# Patient Record
Sex: FEMALE | Race: WHITE | NOT HISPANIC OR LATINO | URBAN - METROPOLITAN AREA
[De-identification: names, ages, dates, MRNs, and addresses within clinical notes are randomized per-mention and may not be internally consistent; named-entity substitution may affect disease eponyms.]

---

## 2017-03-02 ENCOUNTER — APPOINTMENT (OUTPATIENT)
Dept: URBAN - METROPOLITAN AREA CLINIC 200 | Age: 37
Setting detail: DERMATOLOGY
End: 2017-03-03

## 2017-03-02 PROBLEM — D23.5 OTHER BENIGN NEOPLASM OF SKIN OF TRUNK: Status: ACTIVE | Noted: 2017-03-02

## 2017-03-02 PROCEDURE — 99213 OFFICE O/P EST LOW 20 MIN: CPT

## 2017-03-02 PROCEDURE — OTHER COUNSELING: OTHER

## 2018-05-04 ENCOUNTER — APPOINTMENT (OUTPATIENT)
Dept: URBAN - METROPOLITAN AREA CLINIC 200 | Age: 38
Setting detail: DERMATOLOGY
End: 2018-05-09

## 2018-05-04 DIAGNOSIS — L57.8 OTHER SKIN CHANGES DUE TO CHRONIC EXPOSURE TO NONIONIZING RADIATION: ICD-10-CM

## 2018-05-04 PROCEDURE — OTHER COUNSELING: OTHER

## 2018-05-04 PROCEDURE — 99213 OFFICE O/P EST LOW 20 MIN: CPT

## 2018-05-04 ASSESSMENT — LOCATION DETAILED DESCRIPTION DERM: LOCATION DETAILED: LEFT MEDIAL SUPERIOR CHEST

## 2018-05-04 ASSESSMENT — LOCATION ZONE DERM: LOCATION ZONE: TRUNK

## 2018-05-04 ASSESSMENT — LOCATION SIMPLE DESCRIPTION DERM: LOCATION SIMPLE: CHEST

## 2018-10-16 RX ORDER — DESOGESTREL/ETHINYL ESTRADIOL AND ETHINYL ESTRADIOL 21-5 (28)
KIT ORAL
Qty: 28 TABLET | Refills: 3 | Status: SHIPPED | OUTPATIENT
Start: 2018-10-16 | End: 2019-01-26 | Stop reason: SDUPTHER

## 2018-11-26 PROBLEM — F41.1 GENERALIZED ANXIETY DISORDER: Status: ACTIVE | Noted: 2018-11-26

## 2018-11-26 PROBLEM — I10 ESSENTIAL HYPERTENSION, BENIGN: Status: ACTIVE | Noted: 2018-11-26

## 2018-11-26 RX ORDER — LABETALOL 100 MG/1
100 TABLET, FILM COATED ORAL 2 TIMES DAILY
COMMUNITY
End: 2018-12-19 | Stop reason: DRUGHIGH

## 2018-11-26 RX ORDER — LISINOPRIL 20 MG/1
20 TABLET ORAL DAILY
COMMUNITY
End: 2018-12-04 | Stop reason: ALTCHOICE

## 2018-11-26 RX ORDER — ESCITALOPRAM OXALATE 10 MG/1
10 TABLET ORAL DAILY
COMMUNITY
End: 2019-01-28 | Stop reason: SDUPTHER

## 2018-12-04 ENCOUNTER — OFFICE VISIT (OUTPATIENT)
Dept: PRIMARY CARE | Facility: CLINIC | Age: 38
End: 2018-12-04
Payer: COMMERCIAL

## 2018-12-04 VITALS
WEIGHT: 161 LBS | SYSTOLIC BLOOD PRESSURE: 120 MMHG | HEART RATE: 70 BPM | HEIGHT: 62 IN | BODY MASS INDEX: 29.63 KG/M2 | DIASTOLIC BLOOD PRESSURE: 90 MMHG

## 2018-12-04 DIAGNOSIS — F41.1 GENERALIZED ANXIETY DISORDER: ICD-10-CM

## 2018-12-04 DIAGNOSIS — I10 ESSENTIAL HYPERTENSION, BENIGN: Primary | ICD-10-CM

## 2018-12-04 PROCEDURE — 99214 OFFICE O/P EST MOD 30 MIN: CPT | Performed by: NURSE PRACTITIONER

## 2018-12-04 ASSESSMENT — ENCOUNTER SYMPTOMS
NECK PAIN: 0
HYPERTENSION: 1
CONSTITUTIONAL NEGATIVE: 1
SHORTNESS OF BREATH: 0
ORTHOPNEA: 0
RESPIRATORY NEGATIVE: 1
CARDIOVASCULAR NEGATIVE: 1
BLURRED VISION: 0
PALPITATIONS: 0
SWEATS: 0
HEADACHES: 0

## 2018-12-04 NOTE — ASSESSMENT & PLAN NOTE
Increase Labetolol to 200mg BID  BP checks at home.  Call with readings in 3 weeks.  May stop OCs in Jan.  Prenatals daily.  BP check here 3 mo.

## 2018-12-04 NOTE — PROGRESS NOTES
[facility]  Beronica Alaniz  [address], [address]  [place] [address]  Phone: [phone]  Fax: [phone]      Patient ID: Ro Beck                              : 1980    Visit Date: 2018    Chief Complaint: Blood Pressure Check         Patient ID: Ro Beck is a 38 y.o. female.    Patient Active Problem List   Diagnosis   • Essential hypertension, benign   • Generalized anxiety disorder         Current Outpatient Prescriptions:   •  escitalopram (LEXAPRO) 10 mg tablet, Take 10 mg by mouth daily., Disp: , Rfl:   •  labetalol (NORMODYNE) 100 mg tablet, Take 100 mg by mouth 2 (two) times a day., Disp: , Rfl:   •  PIMTREA, 28, 0.15-0.02 mgx21 /0.01 mg x 5 per tablet, take 1 tablet by mouth once daily, Disp: 28 tablet, Rfl: 3    No Known Allergies    Social History     Social History   • Marital status:      Spouse name: N/A   • Number of children: N/A   • Years of education: N/A     Occupational History   • Not on file.     Social History Main Topics   • Smoking status: Never Smoker   • Smokeless tobacco: Never Used   • Alcohol use Not on file   • Drug use: Unknown   • Sexual activity: Not on file     Other Topics Concern   • Not on file     Social History Narrative   • No narrative on file       Health Maintenance   Topic Date Due   • Varicella Vaccines (1 of 2 - 2-dose adolescent series) 1995   • DTaP, Tdap, and Td Vaccines (1 - Tdap) 2001   • Influenza Vaccine (1) 2018   • Pap Smear  2021   • HPV Vaccines  Aged Out   • Meningococcal Vaccine  Aged Out   • HIB Vaccines  Aged Out   • IPV Vaccines  Aged Out       HPI  Bp check on Labetolol. Off Lisinopril. Plans to get pregnant.  Has been on 6 weeks.  Readings at home-- 120-130/90  Feels fine on med.  Still on OCs for now.      Hypertension   This is a chronic problem. The current episode started more than 1 year ago. The problem is unchanged. The problem is  "controlled. Pertinent negatives include no anxiety, blurred vision, chest pain, headaches, malaise/fatigue, neck pain, orthopnea, palpitations, peripheral edema, shortness of breath or sweats. Agents associated with hypertension include oral contraceptives. There are no known risk factors for coronary artery disease. Past treatments include ACE inhibitors and beta blockers. The current treatment provides significant improvement.       The following have been reviewed and updated as appropriate in this visit:  Allergies  Meds  Problems         Review of System  Review of Systems   Constitutional: Negative.  Negative for malaise/fatigue.   Eyes: Negative for blurred vision.   Respiratory: Negative.  Negative for shortness of breath.    Cardiovascular: Negative.  Negative for chest pain, palpitations and orthopnea.   Musculoskeletal: Negative for neck pain.   Neurological: Negative for headaches.       Objective     Vitals  Vitals:    12/04/18 1458 12/04/18 1509   BP: 126/80 120/90   Pulse: 70    Weight: 73 kg (161 lb)    Height: 1.575 m (5' 2\")      Body mass index is 29.45 kg/m².    Physical Exam  Physical Exam   Constitutional: She is oriented to person, place, and time. She appears well-developed and well-nourished. No distress.   Neck: Neck supple. No JVD present. No thyromegaly present.   Cardiovascular: Normal rate, regular rhythm and normal heart sounds.    No murmur heard.  Pulmonary/Chest: Effort normal and breath sounds normal. No respiratory distress. She has no wheezes.   Lymphadenopathy:     She has no cervical adenopathy.   Neurological: She is alert and oriented to person, place, and time.   Skin: She is not diaphoretic.   Vitals reviewed.      Assessment/Plan     Problem List Items Addressed This Visit     Essential hypertension, benign - Primary     Increase Labetolol to 200mg BID  BP checks at home.  Call with readings in 3 weeks.  May stop OCs in Jan.  Prenatals daily.  BP check here 3 mo.      "    Generalized anxiety disorder     Stable on Lexapro.  Will stop once she is pregnant.                   CHOCO Laws  12/4/2018

## 2018-12-19 RX ORDER — LABETALOL 200 MG/1
200 TABLET, FILM COATED ORAL 2 TIMES DAILY
Qty: 60 TABLET | Refills: 5 | Status: SHIPPED | OUTPATIENT
Start: 2018-12-19 | End: 2019-06-11 | Stop reason: SDUPTHER

## 2019-01-29 RX ORDER — DESOGESTREL AND ETHINYL ESTRADIOL 21-5 (28)
KIT ORAL
Qty: 28 TABLET | Refills: 3 | Status: SHIPPED | OUTPATIENT
Start: 2019-01-29 | End: 2019-02-13 | Stop reason: ALTCHOICE

## 2019-01-29 RX ORDER — ESCITALOPRAM OXALATE 10 MG/1
TABLET ORAL
Qty: 30 TABLET | Refills: 5 | Status: SHIPPED | OUTPATIENT
Start: 2019-01-29 | End: 2019-08-02

## 2019-02-13 ENCOUNTER — OFFICE VISIT (OUTPATIENT)
Dept: PRIMARY CARE | Facility: CLINIC | Age: 39
End: 2019-02-13
Payer: COMMERCIAL

## 2019-02-13 ENCOUNTER — TELEPHONE (OUTPATIENT)
Dept: PRIMARY CARE | Facility: CLINIC | Age: 39
End: 2019-02-13

## 2019-02-13 VITALS
HEIGHT: 62 IN | BODY MASS INDEX: 30.16 KG/M2 | DIASTOLIC BLOOD PRESSURE: 70 MMHG | SYSTOLIC BLOOD PRESSURE: 112 MMHG | HEART RATE: 64 BPM | WEIGHT: 163.9 LBS

## 2019-02-13 DIAGNOSIS — I10 ESSENTIAL HYPERTENSION, BENIGN: Primary | ICD-10-CM

## 2019-02-13 DIAGNOSIS — F41.1 GENERALIZED ANXIETY DISORDER: ICD-10-CM

## 2019-02-13 PROCEDURE — 99213 OFFICE O/P EST LOW 20 MIN: CPT | Performed by: NURSE PRACTITIONER

## 2019-02-13 ASSESSMENT — ENCOUNTER SYMPTOMS
NECK PAIN: 0
SHORTNESS OF BREATH: 0
ORTHOPNEA: 0
BLURRED VISION: 0
PALPITATIONS: 0
HEADACHES: 0
SWEATS: 0
HYPERTENSION: 1

## 2019-02-13 NOTE — TELEPHONE ENCOUNTER
SAMREEN pt made her aware must stop lexapro before trying to get pregnant. She understood and will email us when she is stopping

## 2019-02-13 NOTE — TELEPHONE ENCOUNTER
Please call pt. I researched Lexapro use and it is a category C. She should stop prior to getting pregnant.

## 2019-02-13 NOTE — PROGRESS NOTES
[facility]  Beronica Alaniz  [address], [address]  [place] [address]  Phone: [phone]  Fax: [phone]      Patient ID: Ro Beck                              : 1980    Visit Date: 2019    Chief Complaint: Hypertension         Patient ID: Ro Beck is a 38 y.o. female.    Patient Active Problem List   Diagnosis   • Essential hypertension, benign   • Generalized anxiety disorder         Current Outpatient Prescriptions:   •  escitalopram (LEXAPRO) 10 mg tablet, take 1 tablet by mouth once daily, Disp: 30 tablet, Rfl: 5  •  labetalol (NORMODYNE) 200 mg tablet, Take 1 tablet (200 mg total) by mouth 2 (two) times a day., Disp: 60 tablet, Rfl: 5    No Known Allergies    Social History     Social History   • Marital status:      Spouse name: N/A   • Number of children: N/A   • Years of education: N/A     Occupational History   • Not on file.     Social History Main Topics   • Smoking status: Never Smoker   • Smokeless tobacco: Never Used   • Alcohol use Not on file   • Drug use: Unknown   • Sexual activity: Not on file     Other Topics Concern   • Not on file     Social History Narrative   • No narrative on file       Health Maintenance   Topic Date Due   • Varicella Vaccines (1 of 2 - 2-dose adolescent series) 1995   • DTaP, Tdap, and Td Vaccines (1 - Tdap) 2001   • Influenza Vaccine (1) 2018   • Pap Smear  2021   • HPV Vaccines  Aged Out   • Meningococcal Vaccine  Aged Out   • HIB Vaccines  Aged Out   • IPV Vaccines  Aged Out       HPI  BP check.  Tolerating Labetalol well.  Stopped her OCs. No menses yet.    On Lexapro. Asking if she can continue this med if she gets pregnant. Sees this office for her routine GYN care. Will have to choose an OB--prefers [facility].      Hypertension   This is a chronic problem. The current episode started more than 1 year ago. The problem is unchanged. The problem is controlled.  "Pertinent negatives include no anxiety, blurred vision, chest pain, headaches, malaise/fatigue, neck pain, orthopnea, palpitations, peripheral edema, shortness of breath or sweats. There are no associated agents to hypertension. There are no known risk factors for coronary artery disease. Past treatments include beta blockers. The current treatment provides no improvement. There are no compliance problems.        The following have been reviewed and updated as appropriate in this visit:  Allergies  Meds  Problems         Review of System  Review of Systems   Constitutional: Negative for malaise/fatigue.   Eyes: Negative for blurred vision.   Respiratory: Negative for shortness of breath.    Cardiovascular: Negative for chest pain, palpitations and orthopnea.   Musculoskeletal: Negative for neck pain.   Neurological: Negative for headaches.       Objective     Vitals  Vitals:    02/13/19 1328   BP: 112/70   Pulse: 64   Weight: 74.3 kg (163 lb 14.4 oz)   Height: 1.575 m (5' 2\")     Body mass index is 29.98 kg/m².    Physical Exam  Physical Exam   Constitutional: She is oriented to person, place, and time. She appears well-developed and well-nourished. No distress.   Neck: Neck supple. No JVD present. No thyromegaly present.   Cardiovascular: Normal rate, regular rhythm and normal heart sounds.    No murmur heard.  Pulmonary/Chest: Effort normal and breath sounds normal. No respiratory distress. She has no wheezes.   Lymphadenopathy:     She has no cervical adenopathy.   Neurological: She is alert and oriented to person, place, and time.   Skin: She is not diaphoretic.   Vitals reviewed.      Assessment/Plan     Problem List Items Addressed This Visit     Essential hypertension, benign - Primary     Continue med.  BP stable.  Follow up 6 mo.         Generalized anxiety disorder     Will research Lexapro use during pregnancy and get back to pt with recommendations.                   CHOCO Laws  2/13/2019  "

## 2019-07-23 ENCOUNTER — APPOINTMENT (OUTPATIENT)
Dept: URBAN - METROPOLITAN AREA CLINIC 200 | Age: 39
Setting detail: DERMATOLOGY
End: 2019-07-24

## 2019-07-23 DIAGNOSIS — L57.8 OTHER SKIN CHANGES DUE TO CHRONIC EXPOSURE TO NONIONIZING RADIATION: ICD-10-CM

## 2019-07-23 PROCEDURE — 99213 OFFICE O/P EST LOW 20 MIN: CPT

## 2019-07-23 PROCEDURE — OTHER COUNSELING: OTHER

## 2019-07-23 ASSESSMENT — LOCATION ZONE DERM: LOCATION ZONE: TRUNK

## 2019-07-23 ASSESSMENT — LOCATION SIMPLE DESCRIPTION DERM: LOCATION SIMPLE: CHEST

## 2019-07-23 ASSESSMENT — LOCATION DETAILED DESCRIPTION DERM: LOCATION DETAILED: LEFT MEDIAL SUPERIOR CHEST

## 2019-08-02 ENCOUNTER — OFFICE VISIT (OUTPATIENT)
Dept: PRIMARY CARE | Facility: CLINIC | Age: 39
End: 2019-08-02
Payer: COMMERCIAL

## 2019-08-02 VITALS
WEIGHT: 153.6 LBS | DIASTOLIC BLOOD PRESSURE: 80 MMHG | HEIGHT: 62 IN | BODY MASS INDEX: 28.26 KG/M2 | HEART RATE: 70 BPM | SYSTOLIC BLOOD PRESSURE: 140 MMHG

## 2019-08-02 DIAGNOSIS — M77.8 LEFT WRIST TENDONITIS: Primary | ICD-10-CM

## 2019-08-02 DIAGNOSIS — I10 ESSENTIAL HYPERTENSION, BENIGN: ICD-10-CM

## 2019-08-02 PROCEDURE — 99214 OFFICE O/P EST MOD 30 MIN: CPT | Performed by: NURSE PRACTITIONER

## 2019-08-02 RX ORDER — NAPROXEN 500 MG/1
500 TABLET ORAL 2 TIMES DAILY WITH MEALS
Qty: 28 TABLET | Refills: 0 | Status: SHIPPED | OUTPATIENT
Start: 2019-08-02 | End: 2019-08-30 | Stop reason: ALTCHOICE

## 2019-08-02 RX ORDER — LABETALOL 200 MG/1
200 TABLET, FILM COATED ORAL
Qty: 60 TABLET | Refills: 5 | Status: SHIPPED | OUTPATIENT
Start: 2019-08-02 | End: 2020-01-30 | Stop reason: SDUPTHER

## 2019-08-02 ASSESSMENT — ENCOUNTER SYMPTOMS
INABILITY TO BEAR WEIGHT: 0
STIFFNESS: 0
BLURRED VISION: 0
NUMBNESS: 0
SWEATS: 0
SHORTNESS OF BREATH: 0
LIMITED RANGE OF MOTION: 0
HYPERTENSION: 1
JOINT LOCKING: 0
HEADACHES: 0
FEVER: 0
TINGLING: 0

## 2019-08-02 NOTE — ASSESSMENT & PLAN NOTE
Naproxen BID #28  Compression sleeve for wrist during the day.  ICE daily for 20 minutes.  Follow up 2 weeks if not resolved.

## 2019-08-02 NOTE — PROGRESS NOTES
[facility]  Beronica Alaniz  [address], [address]  [place] [address]  Phone: [phone]  Fax: [phone]      Patient ID: Ro Beck                              : 1980    Visit Date: 2019    Chief Complaint: Wrist Pain (left)         Patient ID: Ro Beck is a 38 y.o. female.    Patient Active Problem List   Diagnosis   • Essential hypertension, benign   • Generalized anxiety disorder   • Left wrist tendonitis         Current Outpatient Prescriptions:   •  labetalol (NORMODYNE) 200 mg tablet, Take 1 tablet (200 mg total) by mouth 2 (two) times a day., Disp: 60 tablet, Rfl: 5  •  naproxen (NAPROSYN) 500 mg tablet, Take 1 tablet (500 mg total) by mouth 2 (two) times a day with meals for 14 days., Disp: 28 tablet, Rfl: 0    No Known Allergies    Social History     Social History   • Marital status:      Spouse name: N/A   • Number of children: N/A   • Years of education: N/A     Occupational History   • Not on file.     Social History Main Topics   • Smoking status: Never Smoker   • Smokeless tobacco: Never Used   • Alcohol use Not on file   • Drug use: Unknown   • Sexual activity: Not on file     Other Topics Concern   • Not on file     Social History Narrative   • No narrative on file       Health Maintenance   Topic Date Due   • Varicella Vaccines (1 of 2 - 13+ 2-dose series) 1995   • HIV Screening  1995   • DTaP, Tdap, and Td Vaccines (1 - Tdap) 2001   • Cervical Cancer Screening  2003   • Influenza Vaccine (1) 2019   • Meningococcal ACWY  Aged Out   • HIB Vaccines  Aged Out   • IPV Vaccines  Aged Out   • HPV Vaccines  Aged Out   • Pneumococcal  Aged Out       HPI  L wrist pain x past week. Started after she tried to open a tight jar and felt a little pop.  No redness, swelling, warmth. NL ROM. Pain in thumb pad and radiates to wrist.      Wrist Pain    The pain is present in the left wrist. This is a new  "problem. The current episode started 1 to 4 weeks ago. There has been a history of trauma. The problem occurs constantly. The problem has been unchanged. The quality of the pain is described as sharp. The pain is moderate. Pertinent negatives include no fever, inability to bear weight, itching, joint locking, joint swelling, limited range of motion, numbness, stiffness or tingling. The symptoms are aggravated by activity. She has tried nothing for the symptoms.   Hypertension   This is a chronic problem. The current episode started more than 1 year ago. The problem is unchanged. The problem is controlled. Pertinent negatives include no anxiety, blurred vision, chest pain, headaches, malaise/fatigue, peripheral edema, shortness of breath or sweats. There are no associated agents to hypertension. Past treatments include beta blockers. The current treatment provides significant improvement. There are no compliance problems.        The following have been reviewed and updated as appropriate in this visit:  Allergies  Meds  Problems         Review of System  Review of Systems   Constitutional: Negative for fever and malaise/fatigue.   Eyes: Negative for blurred vision.   Respiratory: Negative for shortness of breath.    Cardiovascular: Negative for chest pain.   Musculoskeletal: Negative for stiffness.   Skin: Negative for itching.   Neurological: Negative for tingling, numbness and headaches.       Objective     Vitals  Vitals:    08/02/19 0912   BP: 140/80   BP Location: Left upper arm   Patient Position: Sitting   Pulse: 70   Weight: 69.7 kg (153 lb 9.6 oz)   Height: 1.575 m (5' 2\")     Body mass index is 28.09 kg/m².    Physical Exam  Physical Exam   Constitutional: She is oriented to person, place, and time. She appears well-developed and well-nourished. No distress.   Cardiovascular: Normal rate, regular rhythm and normal heart sounds.    No murmur heard.  Pulmonary/Chest: Effort normal and breath sounds normal. " No respiratory distress. She has no wheezes. She has no rales.   Musculoskeletal:        Left wrist: She exhibits tenderness and bony tenderness. She exhibits normal range of motion, no swelling, no effusion, no crepitus, no deformity and no laceration.   Neurological: She is alert and oriented to person, place, and time.   Skin: She is not diaphoretic.   Vitals reviewed.      Assessment/Plan     Problem List Items Addressed This Visit     Essential hypertension, benign     Continue Labetolol BID.  Follow up 6 mo.         Relevant Medications    labetalol (NORMODYNE) 200 mg tablet    Left wrist tendonitis - Primary     Naproxen BID #28  Compression sleeve for wrist during the day.  ICE daily for 20 minutes.  Follow up 2 weeks if not resolved.         Relevant Medications    naproxen (NAPROSYN) 500 mg tablet              CHOCO Laws  8/2/2019

## 2019-08-30 ENCOUNTER — OFFICE VISIT (OUTPATIENT)
Dept: PRIMARY CARE | Facility: CLINIC | Age: 39
End: 2019-08-30
Payer: COMMERCIAL

## 2019-08-30 VITALS
DIASTOLIC BLOOD PRESSURE: 80 MMHG | BODY MASS INDEX: 27.79 KG/M2 | HEART RATE: 64 BPM | WEIGHT: 151 LBS | SYSTOLIC BLOOD PRESSURE: 122 MMHG | HEIGHT: 62 IN

## 2019-08-30 DIAGNOSIS — Z01.419 ENCOUNTER FOR GYNECOLOGICAL EXAMINATION WITHOUT ABNORMAL FINDING: Primary | ICD-10-CM

## 2019-08-30 DIAGNOSIS — M77.8 LEFT WRIST TENDONITIS: ICD-10-CM

## 2019-08-30 DIAGNOSIS — Z23 NEED FOR VACCINATION: ICD-10-CM

## 2019-08-30 DIAGNOSIS — I10 ESSENTIAL HYPERTENSION, BENIGN: ICD-10-CM

## 2019-08-30 PROCEDURE — S0612 ANNUAL GYNECOLOGICAL EXAMINA: HCPCS | Performed by: NURSE PRACTITIONER

## 2019-08-30 PROCEDURE — 90686 IIV4 VACC NO PRSV 0.5 ML IM: CPT | Performed by: NURSE PRACTITIONER

## 2019-08-30 PROCEDURE — 90471 IMMUNIZATION ADMIN: CPT | Performed by: NURSE PRACTITIONER

## 2019-08-30 ASSESSMENT — ENCOUNTER SYMPTOMS
CARDIOVASCULAR NEGATIVE: 1
CONSTITUTIONAL NEGATIVE: 1
GASTROINTESTINAL NEGATIVE: 1
RESPIRATORY NEGATIVE: 1

## 2019-08-30 NOTE — PROGRESS NOTES
[facility]  CHOCO Laws  [address]  [place] [address]  Phone: [phone]  Fax: [phone]      2019    Ro Beck is a 38 y.o. female who presents for annual exam.   Periods are regular every 50 days, lasting 5 days. Dysmenorrhea:none. Cyclic symptoms include none. No intermenstrual bleeding, spotting, or discharge.    The patient is sexually active. GYN screening history: last pap: was normal. Domestic violence: no.     Current contraception: none  History of abnormal Pap smear: no  Family history of uterine or ovarian cancer: no  Regular self breast exam: yes  History of abnormal mammogram: no  Family history of breast cancer: no  History of abnormal lipids: no    Menstrual History:  OB History      Para Term  AB Living    0 0 0 0 0 0    SAB TAB Ectopic Multiple Live Births    0 0 0 0 0         Patient's last menstrual period was 2019.         No past medical history on file.    Past Surgical History:   Procedure Laterality Date   • WISDOM TOOTH EXTRACTION         Family History   Problem Relation Age of Onset   • Uterine cancer Mother    • Skin cancer Father    • No Known Problems Sister    • Alzheimer's disease Maternal Grandmother        Social History     Social History   • Marital status:      Spouse name: N/A   • Number of children: N/A   • Years of education: N/A     Occupational History   • Not on file.     Social History Main Topics   • Smoking status: Never Smoker   • Smokeless tobacco: Never Used   • Alcohol use Not on file   • Drug use: Unknown   • Sexual activity: Not on file     Other Topics Concern   • Not on file     Social History Narrative   • No narrative on file       The following have been reviewed and updated as appropriate in this visit: Allergies  Meds  Problems         HPI  Routine GYN exam.        Review Of Systems  Review of Systems   Constitutional: Negative.    Respiratory:  "Negative.    Cardiovascular: Negative.    Gastrointestinal: Negative.    Genitourinary: Negative.    Breast: Negative.       /80 (BP Location: Right upper arm, Patient Position: Sitting)   Pulse 64   Ht 1.575 m (5' 2\")   Wt 68.5 kg (151 lb)   LMP 08/11/2019   BMI 27.62 kg/m²     OB/GYN Physical Exam  Physical Exam   Constitutional: She appears well-developed and well-nourished. No distress.   Genitourinary: Vagina normal and uterus normal.   External female genitalia normal.   Urethral meatus normal.   Vagina normal.   Cervix exam normal.  Uterus is normal.   Adnexa normal.   Neck: Neck supple. No JVD present. No thyromegaly present.   Cardiovascular: Normal rate, regular rhythm and normal heart sounds.    No murmur heard.  Pulmonary/Chest: Effort normal and breath sounds normal. No respiratory distress. She has no wheezes. She has no rales. Right breast exhibits no inverted nipple, no mass, no nipple discharge, no skin change and no tenderness. Left breast exhibits no inverted nipple, no mass, no nipple discharge, no skin change and no tenderness.   Abdominal: Soft. Bowel sounds are normal. She exhibits no distension and no mass. There is no tenderness. There is no rebound and no guarding.   Lymphadenopathy:     She has no cervical adenopathy.   Skin: She is not diaphoretic.   Vitals reviewed.        Problem List Items Addressed This Visit     Essential hypertension, benign    Current Assessment & Plan     Stable on the Labetolol.  Continue med.  Follow up 6 mo.         Left wrist tendonitis    Current Assessment & Plan     50% better after treatment.  Finished Naproxen.  Continue compression sleeve and ice daily.  PRN Aleve if needed.  Will refer to Dr Craven if persists.         Encounter for gynecological examination without abnormal finding - Primary    Current Assessment & Plan     PAP sent.  Continue prenatals.  Continue Labetolol.  Flu shot given.         Relevant Orders    Cytology, Thinprep " Pap    PAP W/REFLEX HR HPV      Other Visit Diagnoses     Need for vaccination        Relevant Orders    Influenza vaccine quadrivalent preservative free 6 mon and older IM (FluLaval) (Completed)              CHOCO Laws  8/30/2019

## 2019-08-30 NOTE — ASSESSMENT & PLAN NOTE
50% better after treatment.  Finished Naproxen.  Continue compression sleeve and ice daily.  PRN Aleve if needed.  Will refer to Dr Craven if persists.

## 2019-09-04 LAB
CYTOLOGIST CVX/VAG CYTO: NORMAL
CYTOLOGY CVX/VAG DOC CYTO: NORMAL
CYTOLOGY CVX/VAG DOC THIN PREP: NORMAL
DX ICD CODE: NORMAL
LAB CORP .: NORMAL
LAB CORP NOTE:: NORMAL
OTHER STN SPEC: NORMAL
STAT OF ADQ CVX/VAG CYTO-IMP: NORMAL

## 2020-01-30 DIAGNOSIS — I10 ESSENTIAL HYPERTENSION, BENIGN: ICD-10-CM

## 2020-01-30 RX ORDER — LABETALOL 200 MG/1
200 TABLET, FILM COATED ORAL
Qty: 60 TABLET | Refills: 5 | Status: SHIPPED | OUTPATIENT
Start: 2020-01-30 | End: 2020-07-27 | Stop reason: SDUPTHER

## 2020-01-30 NOTE — TELEPHONE ENCOUNTER
Medicine Refill Request    Last Office Visit: 8/30/2019  Next Office Visit: 2/19/2020        Current Outpatient Medications:   •  labetalol (NORMODYNE) 200 mg tablet, Take 1 tablet (200 mg total) by mouth 2 (two) times a day., Disp: 60 tablet, Rfl: 5      BP Readings from Last 3 Encounters:   08/30/19 122/80   08/02/19 140/80   02/13/19 112/70       Recent Lab results:  No results found for: CHOL, No results found for: HDL, No results found for: LDLCALC, No results found for: TRIG     No results found for: GLUCOSE, No results found for: HGBA1C      No results found for: CREATININE    No results found for: TSH

## 2020-02-14 ENCOUNTER — OFFICE VISIT (OUTPATIENT)
Dept: PRIMARY CARE | Facility: CLINIC | Age: 40
End: 2020-02-14
Payer: COMMERCIAL

## 2020-02-14 VITALS
HEART RATE: 69 BPM | SYSTOLIC BLOOD PRESSURE: 126 MMHG | HEIGHT: 62 IN | RESPIRATION RATE: 16 BRPM | BODY MASS INDEX: 27.23 KG/M2 | OXYGEN SATURATION: 98 % | WEIGHT: 148 LBS | DIASTOLIC BLOOD PRESSURE: 78 MMHG

## 2020-02-14 DIAGNOSIS — I10 ESSENTIAL HYPERTENSION, BENIGN: ICD-10-CM

## 2020-02-14 DIAGNOSIS — J40 SINOBRONCHITIS: Primary | ICD-10-CM

## 2020-02-14 DIAGNOSIS — J32.9 SINOBRONCHITIS: Primary | ICD-10-CM

## 2020-02-14 DIAGNOSIS — Z00.00 PREVENTATIVE HEALTH CARE: ICD-10-CM

## 2020-02-14 PROBLEM — M77.8 LEFT WRIST TENDONITIS: Status: RESOLVED | Noted: 2019-08-02 | Resolved: 2020-02-14

## 2020-02-14 PROBLEM — Z01.419 ENCOUNTER FOR GYNECOLOGICAL EXAMINATION WITHOUT ABNORMAL FINDING: Status: RESOLVED | Noted: 2019-08-30 | Resolved: 2020-02-14

## 2020-02-14 PROCEDURE — 99214 OFFICE O/P EST MOD 30 MIN: CPT | Performed by: NURSE PRACTITIONER

## 2020-02-14 RX ORDER — BENZONATATE 200 MG/1
200 CAPSULE ORAL 3 TIMES DAILY PRN
Qty: 30 CAPSULE | Refills: 0 | Status: SHIPPED | OUTPATIENT
Start: 2020-02-14 | End: 2020-02-24

## 2020-02-14 RX ORDER — AZITHROMYCIN 250 MG/1
TABLET, FILM COATED ORAL
Qty: 6 TABLET | Refills: 0 | Status: SHIPPED | OUTPATIENT
Start: 2020-02-14 | End: 2020-02-19

## 2020-02-14 ASSESSMENT — ENCOUNTER SYMPTOMS
SHORTNESS OF BREATH: 0
RHINORRHEA: 0
MYALGIAS: 0
HEMOPTYSIS: 0
SORE THROAT: 0
CHILLS: 0
SWEATS: 0
PALPITATIONS: 0
BLURRED VISION: 0
COUGH: 1
FEVER: 0
WHEEZING: 0
HYPERTENSION: 1
HEARTBURN: 0
HEADACHES: 0

## 2020-02-14 NOTE — ASSESSMENT & PLAN NOTE
Zpack as directed #1  Push po fluids  Tessalon Perles PRN cough.  Follow up if symptoms worsen/persist.

## 2020-02-14 NOTE — ASSESSMENT & PLAN NOTE
Stable on current med.  Continue Labetolol BID  Follow up 6 mo  Screening labs ordered to complete once she is feeling better.

## 2020-02-14 NOTE — PROGRESS NOTES
[facility]  Beronica Alaniz  [address]  [place] [address]  Phone: [phone]  Fax: [phone]      Patient ID: Ro Beck                              : 1980    Visit Date: 2020    Chief Complaint: Cough         Patient ID: Ro Beck is a 39 y.o. female.    Patient Active Problem List   Diagnosis   • Essential hypertension, benign   • Generalized anxiety disorder   • Sinobronchitis         Current Outpatient Medications:   •  labetalol (NORMODYNE) 200 mg tablet, Take 1 tablet (200 mg total) by mouth 2 (two) times a day., Disp: 60 tablet, Rfl: 5  •  azithromycin (ZITHROMAX) 250 mg tablet, Take 2 tablets the first day, then 1 tablet daily for 4 days., Disp: 6 tablet, Rfl: 0  •  benzonatate (TESSALON) 200 mg capsule, Take 1 capsule (200 mg total) by mouth 3 (three) times a day as needed for cough for up to 10 days., Disp: 30 capsule, Rfl: 0    No Known Allergies    Social History     Tobacco Use   • Smoking status: Never Smoker   • Smokeless tobacco: Never Used   Substance Use Topics   • Alcohol use: Not on file   • Drug use: Not on file       Health Maintenance   Topic Date Due   • Varicella Vaccines (1 of 2 - 13+ 2-dose series) 1995   • DTaP, Tdap, and Td Vaccines (1 - Tdap) 2001   • HIV Screening  2020 (Originally 1995)   • Cervical Cancer Screening  2024   • Influenza Vaccine  Completed   • Meningococcal ACWY  Aged Out   • HIB Vaccines  Aged Out   • IPV Vaccines  Aged Out   • HPV Vaccines  Aged Out   • Pneumococcal  Aged Out       HPI  Persistent cough after flu like illness that started on .  Also due for BP check up as well.    Cough   This is a new problem. The current episode started in the past 7 days. The problem has been unchanged. The problem occurs every few minutes. The cough is productive of purulent sputum (in AM). Associated symptoms include nasal congestion and postnasal drip. Pertinent  "negatives include no chest pain, chills, ear congestion, ear pain, fever, headaches, heartburn, hemoptysis, myalgias, rhinorrhea, sore throat, shortness of breath, sweats or wheezing. Nothing aggravates the symptoms. She has tried OTC cough suppressant and rest for the symptoms. The treatment provided mild relief.   Hypertension   This is a chronic problem. The current episode started more than 1 year ago. The problem is controlled. Pertinent negatives include no anxiety, blurred vision, chest pain, headaches, palpitations, peripheral edema, shortness of breath or sweats. There are no associated agents to hypertension. Past treatments include beta blockers. The current treatment provides significant improvement. There are no compliance problems.        The following have been reviewed and updated as appropriate in this visit:  Allergies  Meds  Problems         Review of System  Review of Systems   Constitutional: Negative for chills and fever.   HENT: Positive for postnasal drip. Negative for ear pain, rhinorrhea and sore throat.    Eyes: Negative for blurred vision.   Respiratory: Positive for cough. Negative for hemoptysis, shortness of breath and wheezing.    Cardiovascular: Negative for chest pain and palpitations.   Gastrointestinal: Negative for heartburn.   Musculoskeletal: Negative for myalgias.   Neurological: Negative for headaches.       Objective     Vitals  Vitals:    02/14/20 1003   BP: 126/78   BP Location: Left upper arm   Patient Position: Sitting   Pulse: 69   Resp: 16   SpO2: 98%   Weight: 67.1 kg (148 lb)   Height: 1.575 m (5' 2\")     Body mass index is 27.07 kg/m².    Physical Exam  Physical Exam   Constitutional: She appears well-developed and well-nourished. No distress.   HENT:   Right Ear: Tympanic membrane, external ear and ear canal normal.   Left Ear: Tympanic membrane, external ear and ear canal normal.   Nose: Mucosal edema present. No rhinorrhea. Right sinus exhibits maxillary sinus " tenderness. Left sinus exhibits maxillary sinus tenderness.   Mouth/Throat: Posterior oropharyngeal erythema present. No oropharyngeal exudate, posterior oropharyngeal edema or tonsillar abscesses.   Neck: Neck supple. No JVD present. No thyromegaly present.   Cardiovascular: Normal rate, regular rhythm and normal heart sounds. Exam reveals no friction rub.   No murmur heard.  Pulmonary/Chest: Breath sounds normal. No respiratory distress. She has no wheezes. She has no rales.   Deep barking cough noted, non productive   Lymphadenopathy:     She has no cervical adenopathy.   Skin: She is not diaphoretic.   Vitals reviewed.      Assessment/Plan     Problem List Items Addressed This Visit     Essential hypertension, benign     Stable on current med.  Continue Labetolol BID  Follow up 6 mo  Screening labs ordered to complete once she is feeling better.         Sinobronchitis - Primary     Zpack as directed #1  Push po fluids  Tessalon Perles PRN cough.  Follow up if symptoms worsen/persist.         Relevant Medications    azithromycin (ZITHROMAX) 250 mg tablet    benzonatate (TESSALON) 200 mg capsule      Other Visit Diagnoses     Preventative health care        Relevant Orders    CBC and Differential    Comprehensive metabolic panel    Lipid panel    TSH 3rd Generation    Urinalysis with Reflex Culture (ED and Outpatient only)        GYN/ BP check 6 mo      CHOCO Laws  2/14/2020

## 2020-07-20 ENCOUNTER — TELEPHONE (OUTPATIENT)
Dept: PRIMARY CARE | Facility: CLINIC | Age: 40
End: 2020-07-20

## 2020-07-20 NOTE — TELEPHONE ENCOUNTER
Pt called and her coworker has covid . She is having no symptoms. She will quarentine for 14 day and check [facility]  website and call one of the pharmacy to get tested.

## 2020-07-20 NOTE — TELEPHONE ENCOUNTER
If she wishes, we can contact our command center at [place] where she could have the nasal swab done. This is more accurate than the self administered testing. Also, it may be wise to wait a little for the [facility] testing, to day #7 or if she is sxic. THx

## 2020-07-21 NOTE — TELEPHONE ENCOUNTER
Please call patient and triage - if patient wishes contact [facility] center where she can have nasal swab done per Dr. Paul

## 2020-07-22 NOTE — TELEPHONE ENCOUNTER
LMOM for pt to call if she would to be scheduled for COVID testing . Did explain to pt, to wait 7 days before doing test if having no symptoms. Instructed pt to call office if she would like us to set up testing.

## 2020-07-22 NOTE — TELEPHONE ENCOUNTER
Pt called back said [facility] damaged her COVID sample so test was never performed. Pt came in contact with person who is COVID positive last Tuesday. Pt denies any symptoms at present except for a slight headache. Explained to pt [facility] has not been testing pt without symptoms. Pt was going to set up appt at [facility] for another test. Advised pt to continue to self quarantine and monitor temp for 7 more days. Pt agreed. And will notify you if any symptoms present

## 2020-07-27 DIAGNOSIS — I10 ESSENTIAL HYPERTENSION, BENIGN: ICD-10-CM

## 2020-07-27 NOTE — TELEPHONE ENCOUNTER
Medicine Refill Request    Last Office Visit: Visit date not found  Last Telemedicine Visit: Visit date not found    Next Office Visit: Visit date not found  Next Telemedicine Visit: Visit date not found     Pt needs BP check please have her make appointment     Current Outpatient Medications:   •  labetalol (NORMODYNE) 200 mg tablet, Take 1 tablet (200 mg total) by mouth 2 (two) times a day., Disp: 60 tablet, Rfl: 5      BP Readings from Last 3 Encounters:   02/14/20 126/78   08/30/19 122/80   08/02/19 140/80       Recent Lab results:  No results found for: CHOL, No results found for: HDL, No results found for: LDLCALC, No results found for: TRIG     No results found for: GLUCOSE, No results found for: HGBA1C      No results found for: CREATININE    No results found for: TSH

## 2020-07-28 NOTE — TELEPHONE ENCOUNTER
Pt has scheduled GYN appt for 09/01/2020 and is asking if  labetalol (NORMODYNE) 200 mg tablet, Take 1 tablet (200 mg total) by mouth 2 (two) times a day.,  Can be refilled prior to her appt.

## 2020-07-29 RX ORDER — LABETALOL 200 MG/1
200 TABLET, FILM COATED ORAL
Qty: 60 TABLET | Refills: 5 | Status: SHIPPED | OUTPATIENT
Start: 2020-07-29 | End: 2021-01-29 | Stop reason: SDUPTHER

## 2020-07-29 NOTE — TELEPHONE ENCOUNTER
Medicine Refill Request    Last Office Visit: 2/14/2020  Last Telemedicine Visit: Visit date not found    Next Office Visit: 9/1/2020  Next Telemedicine Visit: Visit date not found         Current Outpatient Medications:   •  labetalol (NORMODYNE) 200 mg tablet, Take 1 tablet (200 mg total) by mouth 2 (two) times a day., Disp: 60 tablet, Rfl: 5      BP Readings from Last 3 Encounters:   02/14/20 126/78   08/30/19 122/80   08/02/19 140/80       Recent Lab results:  No results found for: CHOL, No results found for: HDL, No results found for: LDLCALC, No results found for: TRIG     No results found for: GLUCOSE, No results found for: HGBA1C      No results found for: CREATININE    No results found for: TSH

## 2020-08-15 LAB
ALBUMIN SERPL-MCNC: 4.8 G/DL (ref 3.8–4.8)
ALBUMIN/GLOB SERPL: 2.1 {RATIO} (ref 1.2–2.2)
ALP SERPL-CCNC: 59 IU/L (ref 39–117)
ALT SERPL-CCNC: 13 IU/L (ref 0–32)
APPEARANCE UR: CLEAR
AST SERPL-CCNC: 17 IU/L (ref 0–40)
BACTERIA #/AREA URNS HPF: NORMAL /[HPF]
BASOPHILS # BLD AUTO: 0 X10E3/UL (ref 0–0.2)
BASOPHILS NFR BLD AUTO: 0 %
BILIRUB SERPL-MCNC: 0.4 MG/DL (ref 0–1.2)
BILIRUB UR QL STRIP: NEGATIVE
BUN SERPL-MCNC: 9 MG/DL (ref 6–20)
BUN/CREAT SERPL: 11 (ref 9–23)
CALCIUM SERPL-MCNC: 9.7 MG/DL (ref 8.7–10.2)
CHLORIDE SERPL-SCNC: 99 MMOL/L (ref 96–106)
CHOLEST SERPL-MCNC: 175 MG/DL (ref 100–199)
CO2 SERPL-SCNC: 20 MMOL/L (ref 20–29)
COLOR UR: YELLOW
CREAT SERPL-MCNC: 0.81 MG/DL (ref 0.57–1)
EOSINOPHIL # BLD AUTO: 0.2 X10E3/UL (ref 0–0.4)
EOSINOPHIL NFR BLD AUTO: 2 %
EPI CELLS #/AREA URNS HPF: NORMAL /HPF (ref 0–10)
ERYTHROCYTE [DISTWIDTH] IN BLOOD BY AUTOMATED COUNT: 12 % (ref 11.7–15.4)
GLOBULIN SER CALC-MCNC: 2.3 G/DL (ref 1.5–4.5)
GLUCOSE SERPL-MCNC: 98 MG/DL (ref 65–99)
GLUCOSE UR QL: NEGATIVE
HCG INTACT+B SERPL-ACNC: 2756 MIU/ML
HCT VFR BLD AUTO: 36.8 % (ref 34–46.6)
HDLC SERPL-MCNC: 54 MG/DL
HGB BLD-MCNC: 12.9 G/DL (ref 11.1–15.9)
HGB UR QL STRIP: NEGATIVE
IMM GRANULOCYTES # BLD AUTO: 0.1 X10E3/UL (ref 0–0.1)
IMM GRANULOCYTES NFR BLD AUTO: 1 %
KETONES UR QL STRIP: NEGATIVE
LAB CORP EGFR IF AFRICN AM: 107 ML/MIN/1.73
LAB CORP EGFR IF NONAFRICN AM: 93 ML/MIN/1.73
LAB CORP URINALYSIS REFLEX: NORMAL
LDLC SERPL CALC-MCNC: 102 MG/DL (ref 0–99)
LEUKOCYTE ESTERASE UR QL STRIP: NEGATIVE
LYMPHOCYTES # BLD AUTO: 3.9 X10E3/UL (ref 0.7–3.1)
LYMPHOCYTES NFR BLD AUTO: 40 %
MCH RBC QN AUTO: 31.6 PG (ref 26.6–33)
MCHC RBC AUTO-ENTMCNC: 35.1 G/DL (ref 31.5–35.7)
MCV RBC AUTO: 90 FL (ref 79–97)
MICRO URNS: NORMAL
MICRO URNS: NORMAL
MONOCYTES # BLD AUTO: 1 X10E3/UL (ref 0.1–0.9)
MONOCYTES NFR BLD AUTO: 10 %
MUCOUS THREADS URNS QL MICRO: PRESENT
NEUTROPHILS # BLD AUTO: 4.7 X10E3/UL (ref 1.4–7)
NEUTROPHILS NFR BLD AUTO: 47 %
NITRITE UR QL STRIP: NEGATIVE
PH UR STRIP: 5.5 [PH] (ref 5–7.5)
PLATELET # BLD AUTO: 261 X10E3/UL (ref 150–450)
POTASSIUM SERPL-SCNC: 4.2 MMOL/L (ref 3.5–5.2)
PROT SERPL-MCNC: 7.1 G/DL (ref 6–8.5)
PROT UR QL STRIP: NEGATIVE
RBC # BLD AUTO: 4.08 X10E6/UL (ref 3.77–5.28)
RBC #/AREA URNS HPF: NORMAL /HPF (ref 0–2)
SODIUM SERPL-SCNC: 135 MMOL/L (ref 134–144)
SP GR UR: 1.01 (ref 1–1.03)
TRIGL SERPL-MCNC: 93 MG/DL (ref 0–149)
TSH SERPL DL<=0.005 MIU/L-ACNC: 3.15 UIU/ML (ref 0.45–4.5)
UROBILINOGEN UR STRIP-MCNC: 0.2 MG/DL (ref 0.2–1)
VLDLC SERPL CALC-MCNC: 19 MG/DL (ref 5–40)
WBC # BLD AUTO: 9.8 X10E3/UL (ref 3.4–10.8)
WBC #/AREA URNS HPF: NORMAL /HPF (ref 0–5)

## 2020-10-06 ENCOUNTER — APPOINTMENT (OUTPATIENT)
Dept: URBAN - METROPOLITAN AREA CLINIC 200 | Age: 40
Setting detail: DERMATOLOGY
End: 2020-10-27

## 2020-10-06 DIAGNOSIS — L57.8 OTHER SKIN CHANGES DUE TO CHRONIC EXPOSURE TO NONIONIZING RADIATION: ICD-10-CM

## 2020-10-06 DIAGNOSIS — Z80.8 FAMILY HISTORY OF MALIGNANT NEOPLASM OF OTHER ORGANS OR SYSTEMS: ICD-10-CM

## 2020-10-06 PROCEDURE — 99213 OFFICE O/P EST LOW 20 MIN: CPT

## 2020-10-06 PROCEDURE — OTHER MIPS QUALITY: OTHER

## 2020-10-06 PROCEDURE — OTHER COUNSELING: OTHER

## 2020-10-06 ASSESSMENT — LOCATION ZONE DERM
LOCATION ZONE: TRUNK
LOCATION ZONE: NECK

## 2020-10-06 ASSESSMENT — LOCATION DETAILED DESCRIPTION DERM
LOCATION DETAILED: LEFT INFERIOR ANTERIOR NECK
LOCATION DETAILED: LEFT MEDIAL SUPERIOR CHEST

## 2020-10-06 ASSESSMENT — LOCATION SIMPLE DESCRIPTION DERM
LOCATION SIMPLE: CHEST
LOCATION SIMPLE: LEFT ANTERIOR NECK

## 2021-01-29 DIAGNOSIS — I10 ESSENTIAL HYPERTENSION, BENIGN: ICD-10-CM

## 2021-01-29 RX ORDER — LABETALOL 200 MG/1
200 TABLET, FILM COATED ORAL
Qty: 60 TABLET | Refills: 0 | Status: SHIPPED | OUTPATIENT
Start: 2021-01-29 | End: 2021-02-19 | Stop reason: SDUPTHER

## 2021-02-19 ENCOUNTER — OFFICE VISIT (OUTPATIENT)
Dept: PRIMARY CARE | Facility: CLINIC | Age: 41
End: 2021-02-19
Payer: COMMERCIAL

## 2021-02-19 VITALS
OXYGEN SATURATION: 98 % | BODY MASS INDEX: 26.43 KG/M2 | SYSTOLIC BLOOD PRESSURE: 124 MMHG | HEIGHT: 62 IN | DIASTOLIC BLOOD PRESSURE: 80 MMHG | HEART RATE: 64 BPM | WEIGHT: 143.6 LBS | TEMPERATURE: 97.5 F

## 2021-02-19 DIAGNOSIS — F41.1 GENERALIZED ANXIETY DISORDER: ICD-10-CM

## 2021-02-19 DIAGNOSIS — I10 ESSENTIAL HYPERTENSION, BENIGN: Primary | ICD-10-CM

## 2021-02-19 PROBLEM — J40 SINOBRONCHITIS: Status: RESOLVED | Noted: 2020-02-14 | Resolved: 2021-02-19

## 2021-02-19 PROBLEM — J32.9 SINOBRONCHITIS: Status: RESOLVED | Noted: 2020-02-14 | Resolved: 2021-02-19

## 2021-02-19 PROCEDURE — 99214 OFFICE O/P EST MOD 30 MIN: CPT | Performed by: NURSE PRACTITIONER

## 2021-02-19 RX ORDER — SERTRALINE HYDROCHLORIDE 50 MG/1
TABLET, FILM COATED ORAL
Qty: 30 TABLET | Refills: 0 | Status: SHIPPED | OUTPATIENT
Start: 2021-02-19 | End: 2021-03-16 | Stop reason: SDUPTHER

## 2021-02-19 RX ORDER — LABETALOL 200 MG/1
200 TABLET, FILM COATED ORAL
Qty: 180 TABLET | Refills: 1 | Status: SHIPPED | OUTPATIENT
Start: 2021-02-19 | End: 2021-06-17 | Stop reason: SDUPTHER

## 2021-02-19 ASSESSMENT — PATIENT HEALTH QUESTIONNAIRE - PHQ9: SUM OF ALL RESPONSES TO PHQ9 QUESTIONS 1 & 2: 0

## 2021-02-19 ASSESSMENT — ENCOUNTER SYMPTOMS
NERVOUS/ANXIOUS: 1
BLURRED VISION: 0
PANIC: 1
SHORTNESS OF BREATH: 0
DECREASED CONCENTRATION: 0
INSOMNIA: 0
PALPITATIONS: 0
HYPERTENSION: 1
HEADACHES: 0
DEPRESSED MOOD: 0

## 2021-02-19 NOTE — ASSESSMENT & PLAN NOTE
Start Sertraline as directed @ HS  Pt is aware that med is category C. Risk is unknown.  Once she gets pregnant--can discuss further with her OB.  Follow up 1 mo.

## 2021-02-19 NOTE — PROGRESS NOTES
[facility]  Beronica Alaniz  [address]  [place] [address]  Phone: [phone]  Fax: [phone]      Patient ID: Ro Beck                              : 1980    Visit Date: 2021    Chief Complaint: Hypertension         Patient ID: Ro Beck is a 40 y.o. female.    Patient Active Problem List   Diagnosis   • Essential hypertension, benign   • Generalized anxiety disorder         Current Outpatient Medications:   •  labetaloL (NORMODYNE) 200 mg tablet, Take 1 tablet (200 mg total) by mouth 2 (two) times a day., Disp: 180 tablet, Rfl: 1  •  sertraline (ZOLOFT) 50 mg tablet, 1/2 po x 5 days HS then 1 po HS, Disp: 30 tablet, Rfl: 0    No Known Allergies    Social History     Tobacco Use   • Smoking status: Never Smoker   • Smokeless tobacco: Never Used   Substance Use Topics   • Alcohol use: Not on file   • Drug use: Not on file       Health Maintenance   Topic Date Due   • Cervical Cancer Screening  2024   • DTaP, Tdap, and Td Vaccines (2 - Td) 2027   • Influenza Vaccine  Completed   • Meningococcal ACWY  Aged Out   • HIB Vaccines  Aged Out   • IPV Vaccines  Aged Out   • HPV Vaccines  Aged Out   • Pneumococcal  Aged Out   • Varicella Vaccines  Discontinued   • HIV Screening  Discontinued   • Hepatitis C Screening  Discontinued     Past Surgical History:   Procedure Laterality Date   • DILATION AND EVACUATION  2020   • WISDOM TOOTH EXTRACTION       HPI  BP check  Med refill.    Had miscarriage in Sept @ 10 weeks pregnant. Has been extremely anxious about it and trying to get pregnant again. Had to have a D&E in December for retained material.  Now is ready to start trying again and feels she needs anxiety med daily. Asking to start on something.    Hypertension  This is a chronic problem. The current episode started more than 1 year ago. The problem is controlled. Pertinent negatives include no anxiety,  "blurred vision, chest pain, headaches, malaise/fatigue, palpitations, peripheral edema or shortness of breath. There are no associated agents to hypertension. Past treatments include beta blockers. The current treatment provides significant improvement. There are no compliance problems.    Anxiety  Presents for follow-up visit. Symptoms include excessive worry, nervous/anxious behavior and panic. Patient reports no chest pain, decreased concentration, depressed mood, insomnia, palpitations, shortness of breath or suicidal ideas. Symptoms occur constantly. Current severity: moderate to severe. The quality of sleep is good. Nighttime awakenings: occasional.     Compliance with medications: was on Lexapro. Has been off for months. Treatment side effects: None.       The following have been reviewed and updated as appropriate in this visit:  Allergies  Meds  Problems         Review of System  Review of Systems   Constitutional: Negative for malaise/fatigue.   Eyes: Negative for blurred vision.   Respiratory: Negative for shortness of breath.    Cardiovascular: Negative for chest pain and palpitations.   Neurological: Negative for headaches.   Psychiatric/Behavioral: Negative for decreased concentration and suicidal ideas. The patient is nervous/anxious. The patient does not have insomnia.        Objective     Vitals  Vitals:    02/19/21 1520   BP: 124/80   BP Location: Left upper arm   Patient Position: Sitting   Pulse: 64   Temp: 36.4 °C (97.5 °F)   SpO2: 98%   Weight: 65.1 kg (143 lb 9.6 oz)   Height: 1.575 m (5' 2\")     Body mass index is 26.26 kg/m².    Physical Exam  Physical Exam  Vitals signs reviewed.   Constitutional:       General: She is not in acute distress.     Appearance: Normal appearance. She is not diaphoretic.   Cardiovascular:      Rate and Rhythm: Normal rate and regular rhythm.      Heart sounds: No murmur. No friction rub. No gallop.    Pulmonary:      Effort: Pulmonary effort is normal.      " Breath sounds: Normal breath sounds. No wheezing, rhonchi or rales.   Musculoskeletal:      Right lower leg: No edema.      Left lower leg: No edema.   Neurological:      Mental Status: She is alert and oriented to person, place, and time.   Psychiatric:         Mood and Affect: Affect normal. Mood is anxious.         Speech: Speech normal.         Behavior: Behavior normal.         Thought Content: Thought content does not include suicidal ideation. Thought content does not include suicidal plan.         Assessment/Plan     Problem List Items Addressed This Visit     Essential hypertension, benign - Primary     Continue Labetolol BID  Stable on med.  Labs UTD.         Relevant Medications    labetaloL (NORMODYNE) 200 mg tablet    Generalized anxiety disorder     Start Sertraline as directed @ HS  Pt is aware that med is category C. Risk is unknown.  Once she gets pregnant--can discuss further with her OB.  Follow up 1 mo.         Relevant Medications    sertraline (ZOLOFT) 50 mg tablet              CHOCO Laws  2/19/2021

## 2021-06-12 DIAGNOSIS — F41.1 GENERALIZED ANXIETY DISORDER: ICD-10-CM

## 2021-06-14 RX ORDER — SERTRALINE HYDROCHLORIDE 50 MG/1
TABLET, FILM COATED ORAL
Qty: 30 TABLET | Refills: 2 | Status: SHIPPED | OUTPATIENT
Start: 2021-06-14 | End: 2021-06-17 | Stop reason: SDUPTHER

## 2021-06-14 NOTE — TELEPHONE ENCOUNTER
Medicine Refill Request    Last Office Visit: 2/19/2021  Last Telemedicine Visit: Visit date not found    Next Office Visit: 6/17/2021  Next Telemedicine Visit: Visit date not found         Current Outpatient Medications:   •  labetaloL (NORMODYNE) 200 mg tablet, Take 1 tablet (200 mg total) by mouth 2 (two) times a day., Disp: 180 tablet, Rfl: 1  •  sertraline (ZOLOFT) 50 mg tablet, 1 po HS, Disp: 30 tablet, Rfl: 2      BP Readings from Last 3 Encounters:   02/19/21 124/80   02/14/20 126/78   08/30/19 122/80       Recent Lab results:  Lab Results   Component Value Date    CHOL 175 08/14/2020   ,   Lab Results   Component Value Date    HDL 54 08/14/2020   ,   Lab Results   Component Value Date    LDLCALC 102 (H) 08/14/2020   ,   Lab Results   Component Value Date    TRIG 93 08/14/2020        Lab Results   Component Value Date    GLUCOSE 98 08/14/2020   , No results found for: HGBA1C      Lab Results   Component Value Date    CREATININE 0.81 08/14/2020       Lab Results   Component Value Date    TSH 3.150 08/14/2020

## 2021-06-17 ENCOUNTER — OFFICE VISIT (OUTPATIENT)
Dept: PRIMARY CARE | Facility: CLINIC | Age: 41
End: 2021-06-17
Payer: COMMERCIAL

## 2021-06-17 VITALS
WEIGHT: 140 LBS | OXYGEN SATURATION: 98 % | DIASTOLIC BLOOD PRESSURE: 80 MMHG | BODY MASS INDEX: 25.76 KG/M2 | SYSTOLIC BLOOD PRESSURE: 110 MMHG | TEMPERATURE: 97.3 F | HEART RATE: 70 BPM | RESPIRATION RATE: 17 BRPM | HEIGHT: 62 IN

## 2021-06-17 DIAGNOSIS — F41.1 GENERALIZED ANXIETY DISORDER: ICD-10-CM

## 2021-06-17 DIAGNOSIS — I10 ESSENTIAL HYPERTENSION, BENIGN: Primary | ICD-10-CM

## 2021-06-17 PROCEDURE — 99213 OFFICE O/P EST LOW 20 MIN: CPT | Performed by: NURSE PRACTITIONER

## 2021-06-17 PROCEDURE — 3008F BODY MASS INDEX DOCD: CPT | Performed by: NURSE PRACTITIONER

## 2021-06-17 RX ORDER — LABETALOL 200 MG/1
200 TABLET, FILM COATED ORAL
Qty: 180 TABLET | Refills: 1 | Status: SHIPPED | OUTPATIENT
Start: 2021-06-17 | End: 2022-01-12 | Stop reason: SDUPTHER

## 2021-06-17 RX ORDER — SERTRALINE HYDROCHLORIDE 50 MG/1
TABLET, FILM COATED ORAL
Qty: 90 TABLET | Refills: 1 | Status: SHIPPED | OUTPATIENT
Start: 2021-06-17 | End: 2021-08-19 | Stop reason: SDUPTHER

## 2021-06-17 RX ORDER — ASPIRIN 81 MG/1
81 TABLET ORAL DAILY
COMMUNITY
End: 2022-09-21 | Stop reason: ALTCHOICE

## 2021-06-17 ASSESSMENT — ENCOUNTER SYMPTOMS
HYPERTENSION: 1
PALPITATIONS: 0
PANIC: 1
HEADACHES: 0
BLURRED VISION: 0
SHORTNESS OF BREATH: 0
DEPRESSED MOOD: 0
NERVOUS/ANXIOUS: 1

## 2021-06-17 NOTE — PROGRESS NOTES
[facility] at [address]  [place] [address]  [phone]  Fax [phone]      Patient ID: Ro Beck                              : 1980    Visit Date: 2021    Chief Complaint: No chief complaint on file.         Patient ID: Ro Beck is a 40 y.o. female.    Patient Active Problem List   Diagnosis   • Essential hypertension, benign   • Generalized anxiety disorder         Current Outpatient Medications:   •  aspirin 81 mg enteric coated tablet, Take 81 mg by mouth daily., Disp: , Rfl:   •  labetaloL (NORMODYNE) 200 mg tablet, Take 1 tablet (200 mg total) by mouth 2 (two) times a day., Disp: 180 tablet, Rfl: 1  •  sertraline (ZOLOFT) 50 mg tablet, take 1 tablet by mouth at bedtime, Disp: 90 tablet, Rfl: 1    No Known Allergies    Social History     Tobacco Use   • Smoking status: Never Smoker   • Smokeless tobacco: Never Used   Substance Use Topics   • Alcohol use: Not on file   • Drug use: Not on file       Health Maintenance   Topic Date Due   • COVID-19 Vaccine (1) 2021 (Originally 1994)   • Cervical Cancer Screening  2024   • DTaP, Tdap, and Td Vaccines (2 - Td or Tdap) 2027   • Influenza Vaccine  Completed   • Meningococcal ACWY  Aged Out   • HIB Vaccines  Aged Out   • IPV Vaccines  Aged Out   • HPV Vaccines  Aged Out   • Pneumococcal  Aged Out   • Varicella Vaccines  Discontinued   • HIV Screening  Discontinued   • Hepatitis C Screening  Discontinued       HPI  Routine follow up    Hypertension  This is a chronic problem. The current episode started more than 1 year ago. The problem is controlled. Associated symptoms include anxiety. Pertinent negatives include no blurred vision, chest pain, headaches, malaise/fatigue, palpitations or shortness of breath. There are no associated agents to hypertension. Past treatments include beta blockers. The current treatment provides significant  "improvement. There are no compliance problems.    Anxiety  Presents for follow-up visit. Symptoms include nervous/anxious behavior and panic. Patient reports no chest pain, depressed mood, excessive worry, palpitations, shortness of breath or suicidal ideas. Symptoms occur occasionally (improved on med). The severity of symptoms is moderate. The quality of sleep is good.     Compliance with medications is %. Treatment side effects: None.       The following have been reviewed and updated as appropriate in this visit:  Allergies  Meds  Problems         Review of System  Review of Systems   Constitutional: Negative for malaise/fatigue.   Eyes: Negative for blurred vision.   Respiratory: Negative for shortness of breath.    Cardiovascular: Negative for chest pain and palpitations.   Neurological: Negative for headaches.   Psychiatric/Behavioral: Negative for suicidal ideas. The patient is nervous/anxious.        Objective     Vitals  Vitals:    06/17/21 0951   BP: 110/80   Pulse: 70   Resp: 17   Temp: 36.3 °C (97.3 °F)   SpO2: 98%   Weight: 63.5 kg (140 lb)   Height: 1.575 m (5' 2\")     Body mass index is 25.61 kg/m².    Physical Exam  Physical Exam  Vitals reviewed.   Constitutional:       General: She is not in acute distress.     Appearance: Normal appearance. She is not diaphoretic.   Neck:      Thyroid: No thyromegaly.   Cardiovascular:      Rate and Rhythm: Normal rate and regular rhythm.      Heart sounds: No murmur heard.   No friction rub. No gallop.    Pulmonary:      Effort: Pulmonary effort is normal.      Breath sounds: Normal breath sounds. No wheezing, rhonchi or rales.   Musculoskeletal:      Cervical back: Neck supple. No rigidity or tenderness.      Right lower leg: No edema.      Left lower leg: No edema.   Lymphadenopathy:      Cervical: No cervical adenopathy.   Neurological:      Mental Status: She is alert and oriented to person, place, and time.   Psychiatric:         Mood and Affect: " Mood and affect normal.         Speech: Speech normal.         Behavior: Behavior normal.         Thought Content: Thought content does not include suicidal ideation. Thought content does not include suicidal plan.         Assessment/Plan     Problem List Items Addressed This Visit     Essential hypertension, benign - Primary     Stable.  No changes.  Continue med  Follow up 6 mo.  Labs UTD.         Relevant Medications    labetaloL (NORMODYNE) 200 mg tablet    Generalized anxiety disorder     Stable on Sertaline.  No changes.  Follow every 6 mo.         Relevant Medications    sertraline (ZOLOFT) 50 mg tablet      Seeing Melany Quintero next month for workup for possible clotting issue found by her fertility doctor. On baby ASA daily for now.        CHOCO Laws  6/17/2021

## 2021-06-29 ENCOUNTER — APPOINTMENT (OUTPATIENT)
Dept: LAB | Facility: HOSPITAL | Age: 41
End: 2021-06-29
Attending: INTERNAL MEDICINE
Payer: COMMERCIAL

## 2021-06-29 ENCOUNTER — TRANSCRIBE ORDERS (OUTPATIENT)
Dept: LAB | Facility: HOSPITAL | Age: 41
End: 2021-06-29

## 2021-06-29 DIAGNOSIS — D68.69 OTHER THROMBOPHILIA: Primary | ICD-10-CM

## 2021-06-29 DIAGNOSIS — D68.69 OTHER THROMBOPHILIA: ICD-10-CM

## 2021-06-29 LAB
BASOPHILS # BLD: 0.03 K/UL (ref 0.01–0.1)
BASOPHILS NFR BLD: 0.4 %
DIFFERENTIAL METHOD BLD: NORMAL
EOSINOPHIL # BLD: 0.18 K/UL (ref 0.04–0.36)
EOSINOPHIL NFR BLD: 2.6 %
ERYTHROCYTE [DISTWIDTH] IN BLOOD BY AUTOMATED COUNT: 11.9 % (ref 11.7–14.4)
HCT VFR BLDCO AUTO: 36.2 % (ref 35–45)
HGB BLD-MCNC: 12.3 G/DL (ref 11.8–15.7)
IMM GRANULOCYTES # BLD AUTO: 0.02 K/UL (ref 0–0.08)
IMM GRANULOCYTES NFR BLD AUTO: 0.3 %
LYMPHOCYTES # BLD: 2.72 K/UL (ref 1.2–3.5)
LYMPHOCYTES NFR BLD: 39.9 %
MCH RBC QN AUTO: 31 PG (ref 28–33.2)
MCHC RBC AUTO-ENTMCNC: 34 G/DL (ref 32.2–35.5)
MCV RBC AUTO: 91.2 FL (ref 83–98)
MONOCYTES # BLD: 0.59 K/UL (ref 0.28–0.8)
MONOCYTES NFR BLD: 8.7 %
NEUTROPHILS # BLD: 3.28 K/UL (ref 1.7–7)
NEUTROPHILS # BLD: 3.28 K/UL (ref 1.7–7)
NEUTS SEG NFR BLD: 48.1 %
NRBC BLD-RTO: 0 %
PDW BLD AUTO: 9.8 FL (ref 9.4–12.3)
PLATELET # BLD AUTO: 225 K/UL (ref 150–369)
RBC # BLD AUTO: 3.97 M/UL (ref 3.93–5.22)
WBC # BLD AUTO: 6.82 K/UL (ref 3.8–10.5)

## 2021-06-29 PROCEDURE — 36415 COLL VENOUS BLD VENIPUNCTURE: CPT

## 2021-06-29 PROCEDURE — 85025 COMPLETE CBC W/AUTO DIFF WBC: CPT

## 2022-01-03 ENCOUNTER — APPOINTMENT (OUTPATIENT)
Dept: URBAN - METROPOLITAN AREA CLINIC 200 | Age: 42
Setting detail: DERMATOLOGY
End: 2022-01-05

## 2022-01-03 DIAGNOSIS — Z33.1 PREGNANT STATE, INCIDENTAL: ICD-10-CM

## 2022-01-03 DIAGNOSIS — Z80.8 FAMILY HISTORY OF MALIGNANT NEOPLASM OF OTHER ORGANS OR SYSTEMS: ICD-10-CM

## 2022-01-03 DIAGNOSIS — Z11.52 ENCOUNTER FOR SCREENING FOR COVID-19: ICD-10-CM

## 2022-01-03 DIAGNOSIS — L57.8 OTHER SKIN CHANGES DUE TO CHRONIC EXPOSURE TO NONIONIZING RADIATION: ICD-10-CM

## 2022-01-03 PROCEDURE — 99213 OFFICE O/P EST LOW 20 MIN: CPT

## 2022-01-03 PROCEDURE — OTHER SUNSCREEN RECOMMENDATIONS: OTHER

## 2022-01-03 PROCEDURE — OTHER OTHER: OTHER

## 2022-01-03 PROCEDURE — OTHER COUNSELING: OTHER

## 2022-01-03 PROCEDURE — OTHER SCREENING FOR COVID-19: OTHER

## 2022-01-03 PROCEDURE — OTHER MIPS QUALITY: OTHER

## 2022-01-03 ASSESSMENT — LOCATION DETAILED DESCRIPTION DERM
LOCATION DETAILED: PERIUMBILICAL SKIN
LOCATION DETAILED: LEFT INFERIOR ANTERIOR NECK

## 2022-01-03 ASSESSMENT — LOCATION ZONE DERM
LOCATION ZONE: NECK
LOCATION ZONE: TRUNK

## 2022-01-03 ASSESSMENT — LOCATION SIMPLE DESCRIPTION DERM
LOCATION SIMPLE: ABDOMEN
LOCATION SIMPLE: LEFT ANTERIOR NECK

## 2022-01-03 NOTE — PROCEDURE: OTHER
Note Text (......Xxx Chief Complaint.): This diagnosis correlates with the
Other (Free Text): Due June 2022 with a baby girl
Detail Level: Zone
Render Risk Assessment In Note?: no

## 2022-01-12 ENCOUNTER — OFFICE VISIT (OUTPATIENT)
Dept: PRIMARY CARE | Facility: CLINIC | Age: 42
End: 2022-01-12
Payer: COMMERCIAL

## 2022-01-12 VITALS
HEIGHT: 62 IN | OXYGEN SATURATION: 98 % | BODY MASS INDEX: 29.63 KG/M2 | HEART RATE: 84 BPM | WEIGHT: 161 LBS | TEMPERATURE: 98.3 F | SYSTOLIC BLOOD PRESSURE: 110 MMHG | DIASTOLIC BLOOD PRESSURE: 82 MMHG

## 2022-01-12 DIAGNOSIS — Z00.00 ROUTINE PHYSICAL EXAMINATION: Primary | ICD-10-CM

## 2022-01-12 DIAGNOSIS — Z3A.18 18 WEEKS GESTATION OF PREGNANCY: ICD-10-CM

## 2022-01-12 DIAGNOSIS — I10 ESSENTIAL HYPERTENSION, BENIGN: ICD-10-CM

## 2022-01-12 DIAGNOSIS — F41.1 GENERALIZED ANXIETY DISORDER: ICD-10-CM

## 2022-01-12 PROCEDURE — 3008F BODY MASS INDEX DOCD: CPT | Performed by: NURSE PRACTITIONER

## 2022-01-12 PROCEDURE — 99395 PREV VISIT EST AGE 18-39: CPT | Performed by: NURSE PRACTITIONER

## 2022-01-12 RX ORDER — LABETALOL 200 MG/1
200 TABLET, FILM COATED ORAL
Qty: 180 TABLET | Refills: 1 | Status: SHIPPED | OUTPATIENT
Start: 2022-01-12 | End: 2022-05-19 | Stop reason: SDUPTHER

## 2022-01-12 RX ORDER — SERTRALINE HYDROCHLORIDE 50 MG/1
TABLET, FILM COATED ORAL
Qty: 90 TABLET | Refills: 0 | Status: SHIPPED | OUTPATIENT
Start: 2022-01-12 | End: 2022-05-19 | Stop reason: SDUPTHER

## 2022-01-12 ASSESSMENT — ENCOUNTER SYMPTOMS
PSYCHIATRIC NEGATIVE: 1
ENDOCRINE NEGATIVE: 1
RESPIRATORY NEGATIVE: 1
MUSCULOSKELETAL NEGATIVE: 1
CARDIOVASCULAR NEGATIVE: 1
NEUROLOGICAL NEGATIVE: 1
GASTROINTESTINAL NEGATIVE: 1
EYES NEGATIVE: 1
HEMATOLOGIC/LYMPHATIC NEGATIVE: 1
ALLERGIC/IMMUNOLOGIC NEGATIVE: 1
CONSTITUTIONAL NEGATIVE: 1

## 2022-01-12 ASSESSMENT — PATIENT HEALTH QUESTIONNAIRE - PHQ9: SUM OF ALL RESPONSES TO PHQ9 QUESTIONS 1 & 2: 0

## 2022-01-12 NOTE — PROGRESS NOTES
[facility] at [address]  [place] [address]  [phone]  Fax [phone]      Patient ID: Ro Beck                              : 1980    Visit Date: 2022    Chief Complaint: Annual Exam and Med Refill         Patient ID: Ro Beck is a 41 y.o. female.    Patient Active Problem List   Diagnosis   • Essential hypertension, benign   • Generalized anxiety disorder   • Routine physical examination   • 18 weeks gestation of pregnancy         Current Outpatient Medications:   •  aspirin 81 mg enteric coated tablet, Take 81 mg by mouth daily., Disp: , Rfl:   •  labetaloL 200 mg tablet, Take 1 tablet (200 mg total) by mouth 2 (two) times a day., Disp: 180 tablet, Rfl: 1  •  sertraline 50 mg tablet, take 1 tablet by mouth at bedtime, Disp: 90 tablet, Rfl: 0    No Known Allergies    Social History     Tobacco Use   • Smoking status: Never Smoker   • Smokeless tobacco: Never Used   Substance Use Topics   • Alcohol use: Not on file   • Drug use: Not on file       Health Maintenance   Topic Date Due   • COVID-19 Vaccine (3 - Booster for Moderna series) 2022   • Cervical Cancer Screening  2024   • DTaP, Tdap, and Td Vaccines (2 - Td or Tdap) 2027   • Influenza Vaccine  Completed   • Meningococcal ACWY  Aged Out   • HIB Vaccines  Aged Out   • IPV Vaccines  Aged Out   • HPV Vaccines  Aged Out   • Pneumococcal  Aged Out   • HIV Screening  Discontinued   • Hepatitis C Screening  Discontinued     Past Surgical History:   Procedure Laterality Date   • DILATION AND EVACUATION  2020   • WISDOM TOOTH EXTRACTION       Family History   Problem Relation Age of Onset   • Uterine cancer Biological Mother    • Skin cancer Biological Father    • No Known Problems Biological Sister    • Alzheimer's disease Maternal Grandmother        HPI  Routine PE      The following have been reviewed and updated as appropriate in this  "visit:   Allergies  Meds  Problems         Review of System  Review of Systems   Constitutional: Negative.    HENT: Negative.    Eyes: Negative.    Respiratory: Negative.    Cardiovascular: Negative.    Gastrointestinal: Negative.    Endocrine: Negative.    Genitourinary: Negative.    Musculoskeletal: Negative.    Skin: Negative.    Allergic/Immunologic: Negative.    Neurological: Negative.    Hematological: Negative.    Psychiatric/Behavioral: Negative.        Objective     Vitals  Vitals:    01/12/22 0737   BP: 110/82   BP Location: Left upper arm   Patient Position: Sitting   Pulse: 84   Temp: 36.8 °C (98.3 °F)   TempSrc: Oral   SpO2: 98%   Weight: 73 kg (161 lb)   Height: 1.577 m (5' 2.09\")     Body mass index is 29.36 kg/m².    Physical Exam  Physical Exam  Vitals reviewed.   Constitutional:       General: She is not in acute distress.     Appearance: Normal appearance. She is not ill-appearing, toxic-appearing or diaphoretic.   HENT:      Head: Normocephalic.      Right Ear: Tympanic membrane, ear canal and external ear normal.      Left Ear: Tympanic membrane, ear canal and external ear normal.      Nose: Nose normal.      Mouth/Throat:      Mouth: Mucous membranes are moist.      Pharynx: Oropharynx is clear. No oropharyngeal exudate or posterior oropharyngeal erythema.   Eyes:      General:         Right eye: No discharge.         Left eye: No discharge.      Extraocular Movements: Extraocular movements intact.      Conjunctiva/sclera: Conjunctivae normal.      Pupils: Pupils are equal, round, and reactive to light.   Neck:      Thyroid: No thyromegaly.      Vascular: No carotid bruit.   Cardiovascular:      Rate and Rhythm: Normal rate and regular rhythm.      Heart sounds: No murmur heard.    No friction rub. No gallop.   Pulmonary:      Effort: Pulmonary effort is normal.      Breath sounds: Normal breath sounds. No wheezing, rhonchi or rales.   Abdominal:      General: Bowel sounds are normal. There " is no distension.      Palpations: Abdomen is soft. There is no mass.      Tenderness: There is no abdominal tenderness. There is no right CVA tenderness or left CVA tenderness.   Musculoskeletal:         General: No swelling or tenderness.      Cervical back: Neck supple. No rigidity or tenderness.      Right lower leg: No edema.      Left lower leg: No edema.   Lymphadenopathy:      Cervical: No cervical adenopathy.   Skin:     General: Skin is warm and dry.      Coloration: Skin is not pale.      Findings: No erythema or rash.   Neurological:      General: No focal deficit present.      Mental Status: She is alert and oriented to person, place, and time.      Gait: Gait normal.   Psychiatric:         Mood and Affect: Mood and affect normal.         Speech: Speech normal.         Behavior: Behavior normal.         Thought Content: Thought content does not include suicidal ideation. Thought content does not include suicidal plan.         Assessment/Plan     Problem List Items Addressed This Visit     Essential hypertension, benign     Stable BP  Followed by high risk maternal/fetal medicine as well.  Continue med  Follow here as needed or routine 6 mo.         Relevant Medications    labetaloL 200 mg tablet    Generalized anxiety disorder     Stable on Sertraline.  Continue med.  Follow up 6 months         Relevant Medications    sertraline 50 mg tablet    Routine physical examination - Primary     Hold on routine labs until after pregnancy.  Pt due for booster soon and plans to get.           18 weeks gestation of pregnancy     Doing well  Normal pregnancy so far.  OB following  On baby ASA due to hx miscarriage.                   CHOCO Laws  1/12/2022

## 2022-01-12 NOTE — ASSESSMENT & PLAN NOTE
Stable BP  Followed by high risk maternal/fetal medicine as well.  Continue med  Follow here as needed or routine 6 mo.

## 2022-05-19 ENCOUNTER — OFFICE VISIT (OUTPATIENT)
Dept: PRIMARY CARE | Facility: CLINIC | Age: 42
End: 2022-05-19
Payer: COMMERCIAL

## 2022-05-19 VITALS
RESPIRATION RATE: 16 BRPM | TEMPERATURE: 98.4 F | BODY MASS INDEX: 36.36 KG/M2 | OXYGEN SATURATION: 98 % | HEIGHT: 62 IN | DIASTOLIC BLOOD PRESSURE: 98 MMHG | HEART RATE: 80 BPM | WEIGHT: 197.6 LBS | SYSTOLIC BLOOD PRESSURE: 130 MMHG

## 2022-05-19 DIAGNOSIS — I10 ESSENTIAL HYPERTENSION, BENIGN: Primary | ICD-10-CM

## 2022-05-19 DIAGNOSIS — Z3A.36 36 WEEKS GESTATION OF PREGNANCY: ICD-10-CM

## 2022-05-19 DIAGNOSIS — F41.1 GENERALIZED ANXIETY DISORDER: ICD-10-CM

## 2022-05-19 PROCEDURE — 3008F BODY MASS INDEX DOCD: CPT | Performed by: NURSE PRACTITIONER

## 2022-05-19 PROCEDURE — 99213 OFFICE O/P EST LOW 20 MIN: CPT | Performed by: NURSE PRACTITIONER

## 2022-05-19 RX ORDER — LABETALOL 200 MG/1
200 TABLET, FILM COATED ORAL
Qty: 180 TABLET | Refills: 1 | Status: SHIPPED | OUTPATIENT
Start: 2022-05-19 | End: 2022-08-23 | Stop reason: DRUGHIGH

## 2022-05-19 RX ORDER — SERTRALINE HYDROCHLORIDE 50 MG/1
TABLET, FILM COATED ORAL
Qty: 90 TABLET | Refills: 1 | Status: SHIPPED | OUTPATIENT
Start: 2022-05-19 | End: 2022-12-14 | Stop reason: SDUPTHER

## 2022-05-19 ASSESSMENT — ENCOUNTER SYMPTOMS
INSOMNIA: 0
HYPERTENSION: 1
PALPITATIONS: 0
HEADACHES: 0
DEPRESSED MOOD: 0
BLURRED VISION: 0
SHORTNESS OF BREATH: 0
NERVOUS/ANXIOUS: 1

## 2022-05-19 ASSESSMENT — PATIENT HEALTH QUESTIONNAIRE - PHQ9: SUM OF ALL RESPONSES TO PHQ9 QUESTIONS 1 & 2: 0

## 2022-05-19 NOTE — PROGRESS NOTES
"[facility] at [address]  [place] [address]  [phone]  Fax [phone]      Patient ID: Ro Beck                              : 1980    Visit Date: 2022    Chief Complaint: Med Management         Patient ID: Ro Beck is a 41 y.o. female.    Patient Active Problem List   Diagnosis   • Essential hypertension, benign   • Generalized anxiety disorder   • Routine physical examination   • 36 weeks gestation of pregnancy         Current Outpatient Medications:   •  aspirin 81 mg enteric coated tablet, Take 81 mg by mouth daily., Disp: , Rfl:   •  labetaloL (NORMODYNE) 200 mg tablet, Take 1 tablet (200 mg total) by mouth 2 (two) times a day., Disp: 180 tablet, Rfl: 1  •  sertraline (ZOLOFT) 50 mg tablet, take 1 tablet by mouth at bedtime, Disp: 90 tablet, Rfl: 1    No Known Allergies    Social History     Tobacco Use   • Smoking status: Never Smoker   • Smokeless tobacco: Never Used       Health Maintenance   Topic Date Due   • Cervical Cancer Screening  2024   • DTaP, Tdap, and Td Vaccines (3 - Td or Tdap) 2032   • Zoster Vaccine (1 of 2) 2032   • Influenza Vaccine  Completed   • COVID-19 Vaccine  Completed   • Meningococcal ACWY  Aged Out   • HIB Vaccines  Aged Out   • IPV Vaccines  Aged Out   • HPV Vaccines  Aged Out   • Pneumococcal  Aged Out   • HIV Screening  Discontinued   • Hepatitis C Screening  Discontinued       HPI  HPI    The following have been reviewed and updated as appropriate in this visit:   Allergies  Meds  Problems           Review of System  Review of Systems    Objective     Vitals  Vitals:    22 1421 22 1436   BP: (!) 142/88 (!) 130/98   BP Location: Left upper arm    Patient Position: Sitting    Pulse: 80    Resp: 16    Temp: 36.9 °C (98.4 °F)    TempSrc: Oral    SpO2: 98%    Weight: 89.6 kg (197 lb 9.6 oz)    Height: 1.577 m (5' 2.09\")      Body mass index " is 36.04 kg/m².    Physical Exam  Physical Exam    Assessment/Plan     Problem List Items Addressed This Visit     Essential hypertension, benign - Primary    Relevant Medications    labetaloL (NORMODYNE) 200 mg tablet    Generalized anxiety disorder    Relevant Medications    sertraline (ZOLOFT) 50 mg tablet    36 weeks gestation of pregnancy              CHOCO Laws  5/19/2022

## 2022-05-19 NOTE — ASSESSMENT & PLAN NOTE
Up today  Has appt with OB today for fetal stress test.  Will let them know BP up and see if they want her to increase the Labetalol.

## 2022-05-19 NOTE — ASSESSMENT & PLAN NOTE
Scheduled C section June 9th Dr Glass @ [facility].  Doing well  Fetal stress tests biweekly now.  Watching BP.

## 2022-05-19 NOTE — PROGRESS NOTES
[facility] at [address]  [place] [address]  [phone]  Fax [phone]      Patient ID: Ro Beck                              : 1980    Visit Date: 2022    Chief Complaint: Med Management         Patient ID: Ro Beck is a 41 y.o. female.    Patient Active Problem List   Diagnosis   • Essential hypertension, benign   • Generalized anxiety disorder   • Routine physical examination   • 36 weeks gestation of pregnancy         Current Outpatient Medications:   •  aspirin 81 mg enteric coated tablet, Take 81 mg by mouth daily., Disp: , Rfl:   •  labetaloL (NORMODYNE) 200 mg tablet, Take 1 tablet (200 mg total) by mouth 2 (two) times a day., Disp: 180 tablet, Rfl: 1  •  sertraline (ZOLOFT) 50 mg tablet, take 1 tablet by mouth at bedtime, Disp: 90 tablet, Rfl: 1    No Known Allergies    Social History     Tobacco Use   • Smoking status: Never Smoker   • Smokeless tobacco: Never Used       Health Maintenance   Topic Date Due   • Cervical Cancer Screening  2024   • DTaP, Tdap, and Td Vaccines (3 - Td or Tdap) 2032   • Zoster Vaccine (1 of 2) 2032   • Influenza Vaccine  Completed   • COVID-19 Vaccine  Completed   • Meningococcal ACWY  Aged Out   • HIB Vaccines  Aged Out   • IPV Vaccines  Aged Out   • HPV Vaccines  Aged Out   • Pneumococcal  Aged Out   • HIV Screening  Discontinued   • Hepatitis C Screening  Discontinued       HPI  Routine med check.    Hypertension  This is a chronic problem. The current episode started more than 1 year ago. The problem has been gradually worsening since onset. Associated symptoms include anxiety and peripheral edema. Pertinent negatives include no blurred vision, chest pain, headaches, palpitations or shortness of breath. There are no associated agents to hypertension. Past treatments include beta blockers. The current treatment provides significant improvement.  "There are no compliance problems.    Anxiety  Presents for follow-up visit. Symptoms include excessive worry and nervous/anxious behavior. Patient reports no chest pain, depressed mood, insomnia, palpitations, shortness of breath or suicidal ideas. Symptoms occur rarely. The severity of symptoms is mild.     Compliance with medications is % (stable on Sertraline). Treatment side effects: none.       The following have been reviewed and updated as appropriate in this visit:   Allergies  Meds  Problems           Review of System  Review of Systems   Eyes: Negative for blurred vision.   Respiratory: Negative for shortness of breath.    Cardiovascular: Negative for chest pain and palpitations.   Neurological: Negative for headaches.   Psychiatric/Behavioral: Negative for suicidal ideas. The patient is nervous/anxious. The patient does not have insomnia.        Objective     Vitals  Vitals:    05/19/22 1421 05/19/22 1436   BP: (!) 142/88 (!) 130/98   BP Location: Left upper arm    Patient Position: Sitting    Pulse: 80    Resp: 16    Temp: 36.9 °C (98.4 °F)    TempSrc: Oral    SpO2: 98%    Weight: 89.6 kg (197 lb 9.6 oz)    Height: 1.577 m (5' 2.09\")      Body mass index is 36.04 kg/m².    Physical Exam  Physical Exam  Vitals reviewed.   Constitutional:       General: She is not in acute distress.     Appearance: Normal appearance. She is not diaphoretic.   Cardiovascular:      Rate and Rhythm: Normal rate and regular rhythm.      Heart sounds: No murmur heard.    No friction rub. No gallop.   Pulmonary:      Effort: Pulmonary effort is normal.      Breath sounds: Normal breath sounds. No wheezing, rhonchi or rales.   Musculoskeletal:      Right lower leg: No edema.      Left lower leg: No edema.   Neurological:      Mental Status: She is alert and oriented to person, place, and time.   Psychiatric:         Mood and Affect: Mood and affect normal.         Speech: Speech normal.         Behavior: Behavior normal. "         Thought Content: Thought content does not include suicidal ideation. Thought content does not include suicidal plan.         Assessment/Plan     Problem List Items Addressed This Visit     Essential hypertension, benign - Primary     Up today  Has appt with OB today for fetal stress test.  Will let them know BP up and see if they want her to increase the Labetalol.           Relevant Medications    labetaloL (NORMODYNE) 200 mg tablet    Generalized anxiety disorder     Stable on Sertraline.  Continue med.  Follow up 6 mo.           Relevant Medications    sertraline (ZOLOFT) 50 mg tablet    36 weeks gestation of pregnancy     Scheduled C section June 9th Dr Glass @ [facility].  Doing well  Fetal stress tests biweekly now.  Watching BP.                     CHOCO Laws  5/19/2022

## 2022-09-21 ENCOUNTER — OFFICE VISIT (OUTPATIENT)
Dept: PRIMARY CARE | Facility: CLINIC | Age: 42
End: 2022-09-21
Payer: COMMERCIAL

## 2022-09-21 VITALS
SYSTOLIC BLOOD PRESSURE: 120 MMHG | HEIGHT: 62 IN | BODY MASS INDEX: 31.94 KG/M2 | WEIGHT: 173.6 LBS | DIASTOLIC BLOOD PRESSURE: 84 MMHG | TEMPERATURE: 98.3 F | RESPIRATION RATE: 18 BRPM | OXYGEN SATURATION: 99 % | HEART RATE: 63 BPM

## 2022-09-21 DIAGNOSIS — I10 ESSENTIAL HYPERTENSION, BENIGN: Primary | ICD-10-CM

## 2022-09-21 DIAGNOSIS — F41.1 GENERALIZED ANXIETY DISORDER: ICD-10-CM

## 2022-09-21 PROBLEM — Z3A.36 36 WEEKS GESTATION OF PREGNANCY: Status: RESOLVED | Noted: 2022-01-12 | Resolved: 2022-09-21

## 2022-09-21 PROCEDURE — 99213 OFFICE O/P EST LOW 20 MIN: CPT | Performed by: NURSE PRACTITIONER

## 2022-09-21 PROCEDURE — 3008F BODY MASS INDEX DOCD: CPT | Performed by: NURSE PRACTITIONER

## 2022-09-21 ASSESSMENT — ENCOUNTER SYMPTOMS
NERVOUS/ANXIOUS: 1
PANIC: 0
DEPRESSED MOOD: 0
HYPERTENSION: 1
SHORTNESS OF BREATH: 0
PALPITATIONS: 0

## 2022-09-21 NOTE — PROGRESS NOTES
[facility] [place]  [facility] [address]  [place] [address]  [phone]  Fax [phone]      Patient ID: Ro Beck                              : 1980    Visit Date: 2022    Chief Complaint: Follow-up (Wants to discuss BP meds)         Patient ID: Ro Beck is a 42 y.o. female.    Patient Active Problem List   Diagnosis    Essential hypertension, benign    Generalized anxiety disorder    Routine physical examination         Current Outpatient Medications:     labetaloL (NORMODYNE) 300 mg tablet, Take 1 tablet (300 mg total) by mouth 3 (three) times a day., Disp: 270 tablet, Rfl: 0    sertraline (ZOLOFT) 50 mg tablet, take 1 tablet by mouth at bedtime, Disp: 90 tablet, Rfl: 1    No Known Allergies    Social History     Tobacco Use    Smoking status: Never Smoker    Smokeless tobacco: Never Used       Health Maintenance   Topic Date Due    Cervical Cancer Screening  2024    DTaP, Tdap, and Td Vaccines (3 - Td or Tdap) 2032    Zoster Vaccine (1 of 2) 2032    Influenza Vaccine  Completed    COVID-19 Vaccine  Completed    Meningococcal ACWY  Aged Out    HIB Vaccines  Aged Out    IPV Vaccines  Aged Out    HPV Vaccines  Aged Out    Pneumococcal  Aged Out    HIV Screening  Discontinued    Hepatitis C Screening  Discontinued       HPI  BP check  3 months postpartum  Current meds per med list  BP med was increased at the end of her pregnancy.    Hypertension  This is a chronic problem. The current episode started more than 1 year ago. The problem is controlled. Associated symptoms include anxiety. Pertinent negatives include no chest pain, palpitations, peripheral edema or shortness of breath. There are no associated agents to hypertension. Past treatments include beta blockers. The current treatment provides significant improvement. There are no compliance problems.    Anxiety  Presents for  "follow-up visit. Symptoms include excessive worry and nervous/anxious behavior. Patient reports no chest pain, depressed mood, palpitations, panic, shortness of breath or suicidal ideas. Symptoms occur occasionally. The severity of symptoms is mild. The quality of sleep is good.     Compliance with medications is % (on Sertraline). Treatment side effects: none.       The following have been reviewed and updated as appropriate in this visit:   Allergies  Meds  Problems           Review of System  Review of Systems   Respiratory: Negative for shortness of breath.    Cardiovascular: Negative for chest pain and palpitations.   Psychiatric/Behavioral: Negative for suicidal ideas. The patient is nervous/anxious.        Objective     Vitals  Vitals:    09/21/22 1145   BP: 120/84   BP Location: Left upper arm   Patient Position: Sitting   Pulse: 63   Resp: 18   Temp: 36.8 °C (98.3 °F)   TempSrc: Temporal   SpO2: 99%   Weight: 78.7 kg (173 lb 9.6 oz)   Height: 1.575 m (5' 2\")     Body mass index is 31.75 kg/m².    Physical Exam  Physical Exam  Vitals reviewed.   Constitutional:       General: She is not in acute distress.     Appearance: Normal appearance. She is not diaphoretic.   Cardiovascular:      Rate and Rhythm: Normal rate and regular rhythm.      Heart sounds: No murmur heard.    No friction rub. No gallop.   Pulmonary:      Effort: Pulmonary effort is normal.      Breath sounds: Normal breath sounds. No wheezing, rhonchi or rales.   Musculoskeletal:      Right lower leg: No edema.      Left lower leg: No edema.   Neurological:      Mental Status: She is alert and oriented to person, place, and time.         Assessment/Plan     Problem List Items Addressed This Visit     Essential hypertension, benign - Primary     Stable BP  Continue same med for now  Monitor BP closely at home.  May need reduced med as she is further postpartum.  Follow up 6 months--sooner if needed.           Generalized anxiety disorder "     Stable on Sertraline daily  Continue med.  Follow up 6 mo.                     CHOCO Laws  9/21/2022

## 2022-09-21 NOTE — ASSESSMENT & PLAN NOTE
Stable BP  Continue same med for now  Monitor BP closely at home.  May need reduced med as she is further postpartum.  Follow up 6 months--sooner if needed.

## 2022-11-21 RX ORDER — LABETALOL 300 MG/1
TABLET, FILM COATED ORAL
Qty: 270 TABLET | Refills: 0 | Status: SHIPPED | OUTPATIENT
Start: 2022-11-21 | End: 2022-12-14 | Stop reason: SDUPTHER

## 2022-11-21 NOTE — TELEPHONE ENCOUNTER
Medicine Refill Request    Last Office: 9/21/2022   Last Consult Visit: Visit date not found  Last Telemedicine Visit: Visit date not found    Next Appointment: Visit date not found      Current Outpatient Medications:     labetaloL (NORMODYNE) 300 mg tablet, Take 1 tablet (300 mg total) by mouth 3 (three) times a day., Disp: 270 tablet, Rfl: 0    sertraline (ZOLOFT) 50 mg tablet, take 1 tablet by mouth at bedtime, Disp: 90 tablet, Rfl: 1      BP Readings from Last 3 Encounters:   09/21/22 120/84   05/19/22 (!) 130/98   01/12/22 110/82       Recent Lab results:  Lab Results   Component Value Date    CHOL 175 08/14/2020   ,   Lab Results   Component Value Date    HDL 54 08/14/2020   ,   Lab Results   Component Value Date    LDLCALC 102 (H) 08/14/2020   ,   Lab Results   Component Value Date    TRIG 93 08/14/2020        Lab Results   Component Value Date    GLUCOSE 98 08/14/2020   , No results found for: HGBA1C      Lab Results   Component Value Date    CREATININE 0.81 08/14/2020       Lab Results   Component Value Date    TSH 3.150 08/14/2020

## 2023-02-03 ENCOUNTER — OFFICE VISIT (OUTPATIENT)
Dept: PRIMARY CARE | Facility: CLINIC | Age: 43
End: 2023-02-03
Payer: COMMERCIAL

## 2023-02-03 VITALS
RESPIRATION RATE: 18 BRPM | TEMPERATURE: 98.4 F | WEIGHT: 167.6 LBS | SYSTOLIC BLOOD PRESSURE: 132 MMHG | HEART RATE: 70 BPM | HEIGHT: 62 IN | DIASTOLIC BLOOD PRESSURE: 88 MMHG | BODY MASS INDEX: 30.84 KG/M2 | OXYGEN SATURATION: 99 %

## 2023-02-03 DIAGNOSIS — I10 ESSENTIAL HYPERTENSION, BENIGN: Primary | ICD-10-CM

## 2023-02-03 DIAGNOSIS — F41.1 GENERALIZED ANXIETY DISORDER: ICD-10-CM

## 2023-02-03 DIAGNOSIS — Z00.00 PREVENTATIVE HEALTH CARE: ICD-10-CM

## 2023-02-03 PROCEDURE — 3008F BODY MASS INDEX DOCD: CPT | Performed by: NURSE PRACTITIONER

## 2023-02-03 PROCEDURE — 99214 OFFICE O/P EST MOD 30 MIN: CPT | Performed by: NURSE PRACTITIONER

## 2023-02-03 RX ORDER — LABETALOL 200 MG/1
200 TABLET, FILM COATED ORAL 2 TIMES DAILY
Qty: 180 TABLET | Refills: 1 | Status: SHIPPED | OUTPATIENT
Start: 2023-02-03 | End: 2023-08-07

## 2023-02-03 ASSESSMENT — PAIN SCALES - GENERAL: PAINLEVEL: 0-NO PAIN

## 2023-02-03 ASSESSMENT — ENCOUNTER SYMPTOMS
NERVOUS/ANXIOUS: 1
SHORTNESS OF BREATH: 0
PALPITATIONS: 1
HYPERTENSION: 1
PANIC: 0

## 2023-02-03 ASSESSMENT — PATIENT HEALTH QUESTIONNAIRE - PHQ9: SUM OF ALL RESPONSES TO PHQ9 QUESTIONS 1 & 2: 0

## 2023-02-03 NOTE — ASSESSMENT & PLAN NOTE
Lower Labetalol to 200mg BID  Monitor BP at home  Screening labs ordered.  Monitor mild occasional palpitations--avoid stimulants, get enough sleep, stay well hydrated.  Follow up if worsen or persist.  Follow up 6 months

## 2023-02-03 NOTE — PROGRESS NOTES
[facility] at [address]  [place] [address]  [phone]  Fax [phone]      Patient ID: Ro Beck                              : 1980    Visit Date: 2/3/2023    Chief Complaint: Blood Pressure Check         Patient ID: Ro Beck is a 42 y.o. female.    Patient Active Problem List   Diagnosis   • Essential hypertension, benign   • Generalized anxiety disorder   • Routine physical examination         Current Outpatient Medications:   •  labetaloL (NORMODYNE) 200 mg tablet, Take 1 tablet (200 mg total) by mouth 2 (two) times a day., Disp: 180 tablet, Rfl: 1  •  sertraline (ZOLOFT) 50 mg tablet, take 1 tablet by mouth at bedtime, Disp: 90 tablet, Rfl: 0    No Known Allergies    Social History     Tobacco Use   • Smoking status: Never   • Smokeless tobacco: Never       Health Maintenance   Topic Date Due   • Breast Cancer Screening  Never done   • Depression Screening  2024   • Cervical Cancer Screening  2024   • DTaP, Tdap, and Td Vaccines (3 - Td or Tdap) 2032   • Zoster Vaccine (1 of 2) 2032   • Influenza Vaccine  Completed   • COVID-19 Vaccine  Completed   • Meningococcal ACWY  Aged Out   • HIB Vaccines  Aged Out   • IPV Vaccines  Aged Out   • HPV Vaccines  Aged Out   • Pneumococcal  Aged Out   • Hepatitis B Vaccines  Discontinued   • HIV Screening  Discontinued   • Hepatitis C Screening  Discontinued       HPI  BP running 100/50-60 at home regularly  6 months postpartum now--may need a dose change.    Hypertension  This is a chronic problem. The current episode started more than 1 year ago. The problem is unchanged. Associated symptoms include anxiety and palpitations. Pertinent negatives include no chest pain, peripheral edema or shortness of breath. There are no associated agents to hypertension. Past treatments include beta blockers. The current treatment provides significant improvement.  "There are no compliance problems.    Anxiety  Presents for follow-up visit. Symptoms include excessive worry, nervous/anxious behavior and palpitations. Patient reports no chest pain, panic, shortness of breath or suicidal ideas. Symptoms occur occasionally. The severity of symptoms is mild.     Compliance with medications is % (on Sertraline). Treatment side effects: none.       The following have been reviewed and updated as appropriate in this visit:          Review of System  Review of Systems   Respiratory: Negative for shortness of breath.    Cardiovascular: Positive for palpitations. Negative for chest pain.   Psychiatric/Behavioral: Negative for suicidal ideas. The patient is nervous/anxious.        Objective     Vitals  Vitals:    02/03/23 1402   BP: 132/88   BP Location: Left upper arm   Patient Position: Sitting   Pulse: 70   Resp: 18   Temp: 36.9 °C (98.4 °F)   TempSrc: Temporal   SpO2: 99%   Weight: 76 kg (167 lb 9.6 oz)   Height: 1.575 m (5' 2.01\")     Body mass index is 30.65 kg/m².      Physical Exam  Vitals reviewed.   Constitutional:       General: She is not in acute distress.     Appearance: Normal appearance. She is not diaphoretic.   Cardiovascular:      Rate and Rhythm: Normal rate and regular rhythm.      Heart sounds: No murmur heard.    No friction rub. No gallop.   Pulmonary:      Effort: Pulmonary effort is normal.      Breath sounds: Normal breath sounds. No wheezing, rhonchi or rales.   Musculoskeletal:      Right lower leg: No edema.      Left lower leg: No edema.   Neurological:      Mental Status: She is alert and oriented to person, place, and time.   Psychiatric:         Mood and Affect: Mood and affect normal.         Speech: Speech normal.         Behavior: Behavior normal.         Thought Content: Thought content does not include suicidal ideation. Thought content does not include suicidal plan.         Assessment/Plan     Problem List Items Addressed This Visit     " Essential hypertension, benign - Primary     Lower Labetalol to 200mg BID  Monitor BP at home  Screening labs ordered.  Monitor mild occasional palpitations--avoid stimulants, get enough sleep, stay well hydrated.  Follow up if worsen or persist.  Follow up 6 months         Relevant Medications    labetaloL (NORMODYNE) 200 mg tablet    Generalized anxiety disorder     Stable on Sertraline daily  Continue med.  Follow up 6 months.        Other Visit Diagnoses     Preventative health care        Relevant Orders    CBC and Differential    Comprehensive metabolic panel    Lipid panel    TSH 3rd Generation    Urinalysis with Reflex Culture (ED and Outpatient only)        GYN ordered mammogram. Plans to go soon.      CHOCO Laws  2/3/2023

## 2023-04-11 LAB
ALBUMIN SERPL-MCNC: 4.4 G/DL (ref 3.8–4.8)
ALBUMIN/GLOB SERPL: 1.5 {RATIO} (ref 1.2–2.2)
ALP SERPL-CCNC: 94 IU/L (ref 44–121)
ALT SERPL-CCNC: 23 IU/L (ref 0–32)
APPEARANCE UR: CLEAR
AST SERPL-CCNC: 22 IU/L (ref 0–40)
BACTERIA #/AREA URNS HPF: NORMAL /[HPF]
BASOPHILS # BLD AUTO: 0 X10E3/UL (ref 0–0.2)
BASOPHILS NFR BLD AUTO: 0 %
BILIRUB SERPL-MCNC: 0.3 MG/DL (ref 0–1.2)
BILIRUB UR QL STRIP: NEGATIVE
BUN SERPL-MCNC: 9 MG/DL (ref 6–24)
BUN/CREAT SERPL: 10 (ref 9–23)
CALCIUM SERPL-MCNC: 9.8 MG/DL (ref 8.7–10.2)
CASTS URNS QL MICRO: NORMAL /LPF
CHLORIDE SERPL-SCNC: 105 MMOL/L (ref 96–106)
CHOLEST SERPL-MCNC: 271 MG/DL (ref 100–199)
CO2 SERPL-SCNC: 22 MMOL/L (ref 20–29)
COLOR UR: YELLOW
CREAT SERPL-MCNC: 0.93 MG/DL (ref 0.57–1)
EGFRCR SERPLBLD CKD-EPI 2021: 80 ML/MIN/1.73
EOSINOPHIL # BLD AUTO: 0.2 X10E3/UL (ref 0–0.4)
EOSINOPHIL NFR BLD AUTO: 4 %
EPI CELLS #/AREA URNS HPF: NORMAL /HPF (ref 0–10)
ERYTHROCYTE [DISTWIDTH] IN BLOOD BY AUTOMATED COUNT: 12.9 % (ref 11.7–15.4)
GLOBULIN SER CALC-MCNC: 2.9 G/DL (ref 1.5–4.5)
GLUCOSE SERPL-MCNC: 105 MG/DL (ref 70–99)
GLUCOSE UR QL STRIP: NEGATIVE
HCT VFR BLD AUTO: 36.9 % (ref 34–46.6)
HDLC SERPL-MCNC: 50 MG/DL
HGB BLD-MCNC: 12.6 G/DL (ref 11.1–15.9)
HGB UR QL STRIP: NEGATIVE
IMM GRANULOCYTES # BLD AUTO: 0 X10E3/UL (ref 0–0.1)
IMM GRANULOCYTES NFR BLD AUTO: 0 %
KETONES UR QL STRIP: NEGATIVE
LAB CORP URINALYSIS REFLEX: NORMAL
LDLC SERPL CALC-MCNC: 176 MG/DL (ref 0–99)
LEUKOCYTE ESTERASE UR QL STRIP: NEGATIVE
LYMPHOCYTES # BLD AUTO: 3 X10E3/UL (ref 0.7–3.1)
LYMPHOCYTES NFR BLD AUTO: 52 %
MCH RBC QN AUTO: 29.9 PG (ref 26.6–33)
MCHC RBC AUTO-ENTMCNC: 34.1 G/DL (ref 31.5–35.7)
MCV RBC AUTO: 87 FL (ref 79–97)
MICRO URNS: NORMAL
MICRO URNS: NORMAL
MONOCYTES # BLD AUTO: 0.6 X10E3/UL (ref 0.1–0.9)
MONOCYTES NFR BLD AUTO: 10 %
NEUTROPHILS # BLD AUTO: 2 X10E3/UL (ref 1.4–7)
NEUTROPHILS NFR BLD AUTO: 34 %
NITRITE UR QL STRIP: NEGATIVE
PH UR STRIP: 5 [PH] (ref 5–7.5)
PLATELET # BLD AUTO: 185 X10E3/UL (ref 150–450)
POTASSIUM SERPL-SCNC: 4.5 MMOL/L (ref 3.5–5.2)
PROT SERPL-MCNC: 7.3 G/DL (ref 6–8.5)
PROT UR QL STRIP: NEGATIVE
RBC # BLD AUTO: 4.22 X10E6/UL (ref 3.77–5.28)
RBC #/AREA URNS HPF: NORMAL /HPF (ref 0–2)
SODIUM SERPL-SCNC: 142 MMOL/L (ref 134–144)
SP GR UR STRIP: 1.01 (ref 1–1.03)
TRIGL SERPL-MCNC: 240 MG/DL (ref 0–149)
TSH SERPL DL<=0.005 MIU/L-ACNC: 1.52 UIU/ML (ref 0.45–4.5)
UROBILINOGEN UR STRIP-MCNC: 0.2 MG/DL (ref 0.2–1)
VLDLC SERPL CALC-MCNC: 45 MG/DL (ref 5–40)
WBC # BLD AUTO: 5.8 X10E3/UL (ref 3.4–10.8)
WBC #/AREA URNS HPF: NORMAL /HPF (ref 0–5)

## 2023-07-24 ENCOUNTER — APPOINTMENT (OUTPATIENT)
Dept: URBAN - METROPOLITAN AREA CLINIC 203 | Age: 43
Setting detail: DERMATOLOGY
End: 2023-07-26

## 2023-07-24 DIAGNOSIS — L57.8 OTHER SKIN CHANGES DUE TO CHRONIC EXPOSURE TO NONIONIZING RADIATION: ICD-10-CM

## 2023-07-24 DIAGNOSIS — Z80.8 FAMILY HISTORY OF MALIGNANT NEOPLASM OF OTHER ORGANS OR SYSTEMS: ICD-10-CM

## 2023-07-24 PROCEDURE — 99213 OFFICE O/P EST LOW 20 MIN: CPT

## 2023-07-24 PROCEDURE — OTHER SUNSCREEN RECOMMENDATIONS: OTHER

## 2023-07-24 PROCEDURE — OTHER COUNSELING: OTHER

## 2023-07-24 ASSESSMENT — LOCATION DETAILED DESCRIPTION DERM
LOCATION DETAILED: LEFT MEDIAL BREAST 11-12:00 REGION
LOCATION DETAILED: LEFT MEDIAL BREAST 10-11:00 REGION
LOCATION DETAILED: PERIUMBILICAL SKIN

## 2023-07-24 ASSESSMENT — LOCATION ZONE DERM: LOCATION ZONE: TRUNK

## 2023-07-24 ASSESSMENT — LOCATION SIMPLE DESCRIPTION DERM
LOCATION SIMPLE: LEFT BREAST
LOCATION SIMPLE: ABDOMEN

## 2023-10-30 DIAGNOSIS — I10 ESSENTIAL HYPERTENSION, BENIGN: ICD-10-CM

## 2023-10-30 RX ORDER — LABETALOL 200 MG/1
200 TABLET, FILM COATED ORAL 2 TIMES DAILY
Qty: 180 TABLET | Refills: 0 | Status: SHIPPED | OUTPATIENT
Start: 2023-10-30 | End: 2023-11-21 | Stop reason: SDUPTHER

## 2023-10-30 NOTE — TELEPHONE ENCOUNTER
"Medicine Refill Request    Last Office Visit: 2/3/2023   Last Consult Visit: Visit date not found  Last Telemedicine Visit: Visit date not found    Next Appointment: 11/3/2023      Current Outpatient Medications:     labetaloL (NORMODYNE) 200 mg tablet, take 1 tablet by mouth twice a day, Disp: 180 tablet, Rfl: 0    sertraline (ZOLOFT) 50 mg tablet, take 1 tablet by mouth at bedtime, Disp: 90 tablet, Rfl: 0      BP Readings from Last 3 Encounters:   02/03/23 132/88   09/21/22 120/84   05/19/22 (!) 130/98       Recent Lab results:  Lab Results   Component Value Date    CHOL 271 (H) 04/10/2023   ,   Lab Results   Component Value Date    HDL 50 04/10/2023   ,   Lab Results   Component Value Date    LDLCALC 176 (H) 04/10/2023   ,   Lab Results   Component Value Date    TRIG 240 (H) 04/10/2023        Lab Results   Component Value Date    GLUCOSE 105 (H) 04/10/2023   , No results found for: \"HGBA1C\"      Lab Results   Component Value Date    CREATININE 0.93 04/10/2023       Lab Results   Component Value Date    TSH 1.520 04/10/2023         No results found for: \"HGBA1C\"  "

## 2023-11-03 ENCOUNTER — OFFICE VISIT (OUTPATIENT)
Dept: PRIMARY CARE | Facility: CLINIC | Age: 43
End: 2023-11-03
Payer: COMMERCIAL

## 2023-11-03 VITALS
WEIGHT: 162 LBS | DIASTOLIC BLOOD PRESSURE: 80 MMHG | SYSTOLIC BLOOD PRESSURE: 120 MMHG | OXYGEN SATURATION: 98 % | HEIGHT: 62 IN | RESPIRATION RATE: 18 BRPM | BODY MASS INDEX: 29.81 KG/M2 | HEART RATE: 68 BPM

## 2023-11-03 DIAGNOSIS — I10 ESSENTIAL HYPERTENSION, BENIGN: Primary | ICD-10-CM

## 2023-11-03 DIAGNOSIS — Z00.00 ROUTINE PHYSICAL EXAMINATION: ICD-10-CM

## 2023-11-03 DIAGNOSIS — E78.00 HYPERCHOLESTEROLEMIA: ICD-10-CM

## 2023-11-03 DIAGNOSIS — F41.1 GENERALIZED ANXIETY DISORDER: ICD-10-CM

## 2023-11-03 PROCEDURE — 3008F BODY MASS INDEX DOCD: CPT | Performed by: NURSE PRACTITIONER

## 2023-11-03 PROCEDURE — 99396 PREV VISIT EST AGE 40-64: CPT | Performed by: NURSE PRACTITIONER

## 2023-11-03 PROCEDURE — 3074F SYST BP LT 130 MM HG: CPT | Performed by: NURSE PRACTITIONER

## 2023-11-03 PROCEDURE — 3079F DIAST BP 80-89 MM HG: CPT | Performed by: NURSE PRACTITIONER

## 2023-11-03 ASSESSMENT — ENCOUNTER SYMPTOMS
CONSTITUTIONAL NEGATIVE: 1
CARDIOVASCULAR NEGATIVE: 1
EYES NEGATIVE: 1
HEMATOLOGIC/LYMPHATIC NEGATIVE: 1
NEUROLOGICAL NEGATIVE: 1
RESPIRATORY NEGATIVE: 1
ENDOCRINE NEGATIVE: 1
ALLERGIC/IMMUNOLOGIC NEGATIVE: 1
GASTROINTESTINAL NEGATIVE: 1
PSYCHIATRIC NEGATIVE: 1
MUSCULOSKELETAL NEGATIVE: 1

## 2023-11-03 NOTE — ASSESSMENT & PLAN NOTE
Ok to increase Sertraline to 75mg HS  Can even go to 100mg if needed  Pt will report dose after she tries an increase.  Follow up 6 mo

## 2023-11-03 NOTE — ASSESSMENT & PLAN NOTE
Latest Reference Range & Units 04/10/23 08:52   Cholesterol 100 - 199 mg/dL 271 (H)   Triglycerides 0 - 149 mg/dL 240 (H)   HDL >39 mg/dL 50   LDL Calculated 0 - 99 mg/dL 176 (H)   (H): Data is abnormally high    Repeat lipids

## 2023-11-03 NOTE — PROGRESS NOTES
[facility] [place]  [facility]  [address]  [facility] [place] [facility] [address]  [phone]  Fax [phone]      Patient ID: Ro Beck                              : 1980    Visit Date: 11/3/2023    Chief Complaint: Annual Exam    Patient Active Problem List   Diagnosis    Essential hypertension, benign    Generalized anxiety disorder    Routine physical examination    Hypercholesterolemia       Current Outpatient Medications   Medication Sig Dispense Refill    labetaloL (NORMODYNE) 200 mg tablet take 1 tablet by mouth twice a day 180 tablet 0    sertraline (ZOLOFT) 50 mg tablet take 1 tablet by mouth at bedtime 90 tablet 0     No current facility-administered medications for this visit.       HPI  Routine PE      No Known Allergies    Past Surgical History:   Procedure Laterality Date    DILATION AND EVACUATION  2020    WISDOM TOOTH EXTRACTION         No past medical history on file.    Health Maintenance   Topic Date Due    Breast Cancer Screening  Never done    Depression Screening  2024    Cervical Cancer Screening  2024    DTaP, Tdap, and Td Vaccines (3 - Td or Tdap) 2032    Zoster Vaccine (1 of 2) 2032    Influenza Vaccine  Completed    COVID-19 Vaccine  Completed    Meningococcal ACWY  Aged Out    HIB Vaccines  Aged Out    IPV Vaccines  Aged Out    HPV Vaccines  Aged Out    Pneumococcal  Aged Out    Hepatitis B Vaccines  Discontinued    HIV Screening  Discontinued    Hepatitis C Screening  Discontinued       Social History     Socioeconomic History    Marital status:      Spouse name: None    Number of children: None    Years of education: None    Highest education level: None   Tobacco Use    Smoking status: Never    Smokeless tobacco: Never       Family History   Problem Relation Age of Onset    Uterine cancer Biological Mother     Skin cancer Biological Father     No Known Problems  "Biological Sister     Alzheimer's disease Maternal Grandmother        Review Of Systems  Review of Systems   Constitutional: Negative.    HENT: Negative.    Eyes: Negative.    Respiratory: Negative.    Cardiovascular: Negative.    Gastrointestinal: Negative.    Endocrine: Negative.    Genitourinary: Negative.    Musculoskeletal: Negative.    Skin: Negative.    Allergic/Immunologic: Negative.    Neurological: Negative.    Hematological: Negative.    Psychiatric/Behavioral: Negative.         Vitals:    11/03/23 1412   BP: 120/80   BP Location: Left upper arm   Patient Position: Sitting   Pulse: 68   Resp: 18   SpO2: 98%   Weight: 73.5 kg (162 lb)   Height: 1.575 m (5' 2.01\")       Body mass index is 29.62 kg/m².      Physical Exam  Vitals reviewed.   Constitutional:       General: She is not in acute distress.     Appearance: Normal appearance. She is not ill-appearing, toxic-appearing or diaphoretic.   HENT:      Head: Normocephalic.      Right Ear: Tympanic membrane, ear canal and external ear normal.      Left Ear: Tympanic membrane, ear canal and external ear normal.      Nose: Nose normal.      Mouth/Throat:      Mouth: Mucous membranes are moist.      Pharynx: Oropharynx is clear. No oropharyngeal exudate or posterior oropharyngeal erythema.   Eyes:      General:         Right eye: No discharge.         Left eye: No discharge.      Extraocular Movements: Extraocular movements intact.      Conjunctiva/sclera: Conjunctivae normal.      Pupils: Pupils are equal, round, and reactive to light.   Neck:      Thyroid: No thyromegaly.   Cardiovascular:      Rate and Rhythm: Normal rate and regular rhythm.      Heart sounds: No murmur heard.     No friction rub. No gallop.   Pulmonary:      Effort: Pulmonary effort is normal.      Breath sounds: Normal breath sounds. No wheezing, rhonchi or rales.   Abdominal:      General: Bowel sounds are normal. There is no distension.      Palpations: Abdomen is soft. There is no " mass.      Tenderness: There is no abdominal tenderness. There is no right CVA tenderness or left CVA tenderness.   Musculoskeletal:      Cervical back: Neck supple. No rigidity or tenderness.      Right lower leg: No edema.      Left lower leg: No edema.   Lymphadenopathy:      Cervical: No cervical adenopathy.   Skin:     General: Skin is warm and dry.      Coloration: Skin is not pale.      Findings: No erythema or rash.   Neurological:      General: No focal deficit present.      Mental Status: She is alert and oriented to person, place, and time.      Gait: Gait normal.      Deep Tendon Reflexes: Reflexes normal.   Psychiatric:         Mood and Affect: Mood and affect normal.         Speech: Speech normal.         Behavior: Behavior normal.         Assessment/Plan     Problem List Items Addressed This Visit     Essential hypertension, benign - Primary    Current Assessment & Plan     BP stable.  Continue Labetalol BID.  Follow up 6 months         Generalized anxiety disorder    Current Assessment & Plan     Ok to increase Sertraline to 75mg HS  Can even go to 100mg if needed  Pt will report dose after she tries an increase.  Follow up 6 mo         Routine physical examination    Current Assessment & Plan     NL exam.  UTD with GYN and mammo  Vaccines UTD         Hypercholesterolemia    Current Assessment & Plan      Latest Reference Range & Units 04/10/23 08:52   Cholesterol 100 - 199 mg/dL 271 (H)   Triglycerides 0 - 149 mg/dL 240 (H)   HDL >39 mg/dL 50   LDL Calculated 0 - 99 mg/dL 176 (H)   (H): Data is abnormally high    Repeat lipids         Relevant Orders    Lipid panel    Comprehensive metabolic panel   Other Visit Diagnoses     Breast cancer screening by mammogram        Need for vaccination                      CHOCO Laws  11/3/2023

## 2024-01-05 ENCOUNTER — OFFICE VISIT (OUTPATIENT)
Dept: PRIMARY CARE | Facility: CLINIC | Age: 44
End: 2024-01-05
Payer: COMMERCIAL

## 2024-01-05 VITALS
HEART RATE: 83 BPM | SYSTOLIC BLOOD PRESSURE: 140 MMHG | DIASTOLIC BLOOD PRESSURE: 80 MMHG | WEIGHT: 156 LBS | BODY MASS INDEX: 28.71 KG/M2 | OXYGEN SATURATION: 99 % | TEMPERATURE: 98.5 F | HEIGHT: 62 IN

## 2024-01-05 DIAGNOSIS — J01.00 ACUTE NON-RECURRENT MAXILLARY SINUSITIS: Primary | ICD-10-CM

## 2024-01-05 PROCEDURE — 99213 OFFICE O/P EST LOW 20 MIN: CPT | Performed by: NURSE PRACTITIONER

## 2024-01-05 PROCEDURE — 3079F DIAST BP 80-89 MM HG: CPT | Performed by: NURSE PRACTITIONER

## 2024-01-05 PROCEDURE — 3008F BODY MASS INDEX DOCD: CPT | Performed by: NURSE PRACTITIONER

## 2024-01-05 PROCEDURE — 3077F SYST BP >= 140 MM HG: CPT | Performed by: NURSE PRACTITIONER

## 2024-01-05 RX ORDER — AMOXICILLIN AND CLAVULANATE POTASSIUM 875; 125 MG/1; MG/1
1 TABLET, FILM COATED ORAL 2 TIMES DAILY
Qty: 20 TABLET | Refills: 0 | Status: SHIPPED | OUTPATIENT
Start: 2024-01-05 | End: 2024-01-15

## 2024-01-05 ASSESSMENT — ENCOUNTER SYMPTOMS
SWOLLEN GLANDS: 0
SORE THROAT: 0
NECK PAIN: 0
HOARSE VOICE: 0
SHORTNESS OF BREATH: 0
DIAPHORESIS: 0
COUGH: 1
SINUS PRESSURE: 1
SINUS COMPLAINT: 1
CHILLS: 0
HEADACHES: 1

## 2024-01-05 NOTE — PROGRESS NOTES
[facility] at [address]  [place] [address]  [phone]  Fax [phone]      Patient ID: Ro Beck                              : 1980    Visit Date: 2024    Chief Complaint: Sinusitis         Patient ID: Ro Beck is a 43 y.o. female.    Patient Active Problem List   Diagnosis   • Essential hypertension, benign   • Generalized anxiety disorder   • Routine physical examination   • Hypercholesterolemia   • Acute non-recurrent maxillary sinusitis         Current Outpatient Medications:   •  amoxicillin-pot clavulanate (AUGMENTIN) 875-125 mg per tablet, Take 1 tablet by mouth 2 (two) times a day for 10 days., Disp: 20 tablet, Rfl: 0  •  labetaloL (NORMODYNE) 200 mg tablet, Take 1 tablet (200 mg total) by mouth 2 (two) times a day., Disp: 180 tablet, Rfl: 0  •  sertraline (ZOLOFT) 50 mg tablet, take 1.5 tablet by mouth at bedtime, Disp: 135 tablet, Rfl: 0    No Known Allergies    Social History     Tobacco Use   • Smoking status: Never   • Smokeless tobacco: Never       Health Maintenance   Topic Date Due   • Depression Screening  2024   • Cervical Cancer Screening  2024   • Breast Cancer Screening  10/02/2024   • DTaP, Tdap, and Td Vaccines (3 - Td or Tdap) 2032   • Zoster Vaccine (1 of 2) 2032   • Influenza Vaccine  Completed   • COVID-19 Vaccine  Completed   • Meningococcal ACWY  Aged Out   • HIB Vaccines  Aged Out   • IPV Vaccines  Aged Out   • HPV Vaccines  Aged Out   • Pneumococcal  Aged Out   • Hepatitis B Vaccines  Discontinued   • HIV Screening  Discontinued   • Hepatitis C Screening  Discontinued       HPI  Cold symptoms in December.  Never really went fully away  Now more sinus pain and pressure with earache.    Sinus Problem  This is a new problem. The current episode started 1 to 4 weeks ago. The problem has been gradually worsening since onset. There has been no fever. The pain  "is moderate. Associated symptoms include congestion, coughing, ear pain, headaches and sinus pressure. Pertinent negatives include no chills, diaphoresis, hoarse voice, neck pain, shortness of breath, sneezing, sore throat or swollen glands. Treatments tried: Ibuprofen. The treatment provided mild relief.       The following have been reviewed and updated as appropriate in this visit:   Allergies  Meds  Problems         Review of System  Review of Systems   Constitutional: Negative for chills and diaphoresis.   HENT: Positive for congestion, ear pain and sinus pressure. Negative for hoarse voice, sneezing and sore throat.    Respiratory: Positive for cough. Negative for shortness of breath.    Musculoskeletal: Negative for neck pain.   Neurological: Positive for headaches.       Objective     Vitals  Vitals:    01/05/24 0738   BP: 140/80   Pulse: 83   Temp: 36.9 °C (98.5 °F)   SpO2: 99%   Weight: 70.8 kg (156 lb)   Height: 1.575 m (5' 2\")     Body mass index is 28.53 kg/m².      Physical Exam  Vitals reviewed.   Constitutional:       General: She is not in acute distress.     Appearance: Normal appearance. She is not ill-appearing, toxic-appearing or diaphoretic.   HENT:      Right Ear: Ear canal and external ear normal. Tympanic membrane is erythematous and bulging.      Left Ear: Tympanic membrane, ear canal and external ear normal.      Nose: Mucosal edema present.      Right Sinus: Maxillary sinus tenderness present.      Left Sinus: Maxillary sinus tenderness present.      Mouth/Throat:      Pharynx: Pharyngeal swelling and posterior oropharyngeal erythema present. No oropharyngeal exudate or uvula swelling.   Neurological:      Mental Status: She is alert.         Assessment/Plan     Problem List Items Addressed This Visit     Acute non-recurrent maxillary sinusitis - Primary     Augmentin BID #20 with food  Push po fluids  Steam sinuses in shower  Saline NS/salt water gargles PRN  Follow up if symptoms " worsen/persist.           Relevant Medications    amoxicillin-pot clavulanate (AUGMENTIN) 875-125 mg per tablet           CHOCO Laws  1/5/2024

## 2024-01-05 NOTE — ASSESSMENT & PLAN NOTE
Augmentin BID #20 with food  Push po fluids  Steam sinuses in shower  Saline NS/salt water gargles PRN  Follow up if symptoms worsen/persist.

## 2024-03-04 ENCOUNTER — APPOINTMENT (OUTPATIENT)
Dept: URBAN - METROPOLITAN AREA CLINIC 203 | Age: 44
Setting detail: DERMATOLOGY
End: 2024-03-06

## 2024-03-04 DIAGNOSIS — L57.8 OTHER SKIN CHANGES DUE TO CHRONIC EXPOSURE TO NONIONIZING RADIATION: ICD-10-CM

## 2024-03-04 DIAGNOSIS — D22 MELANOCYTIC NEVI: ICD-10-CM

## 2024-03-04 DIAGNOSIS — L21.8 OTHER SEBORRHEIC DERMATITIS: ICD-10-CM

## 2024-03-04 DIAGNOSIS — Z80.8 FAMILY HISTORY OF MALIGNANT NEOPLASM OF OTHER ORGANS OR SYSTEMS: ICD-10-CM

## 2024-03-04 PROBLEM — D22.5 MELANOCYTIC NEVI OF TRUNK: Status: ACTIVE | Noted: 2024-03-04

## 2024-03-04 PROCEDURE — OTHER PRESCRIPTION MEDICATION MANAGEMENT: OTHER

## 2024-03-04 PROCEDURE — OTHER REASSURANCE: OTHER

## 2024-03-04 PROCEDURE — OTHER COUNSELING: OTHER

## 2024-03-04 PROCEDURE — 99214 OFFICE O/P EST MOD 30 MIN: CPT

## 2024-03-04 PROCEDURE — OTHER SUNSCREEN RECOMMENDATIONS: OTHER

## 2024-03-04 ASSESSMENT — LOCATION DETAILED DESCRIPTION DERM
LOCATION DETAILED: RIGHT MEDIAL UPPER BACK
LOCATION DETAILED: GLABELLA
LOCATION DETAILED: PERIUMBILICAL SKIN
LOCATION DETAILED: LEFT MEDIAL BREAST 10-11:00 REGION
LOCATION DETAILED: LEFT MEDIAL BREAST 11-12:00 REGION

## 2024-03-04 ASSESSMENT — LOCATION SIMPLE DESCRIPTION DERM
LOCATION SIMPLE: RIGHT UPPER BACK
LOCATION SIMPLE: ABDOMEN
LOCATION SIMPLE: GLABELLA
LOCATION SIMPLE: LEFT BREAST

## 2024-03-04 ASSESSMENT — LOCATION ZONE DERM
LOCATION ZONE: FACE
LOCATION ZONE: TRUNK

## 2024-03-04 NOTE — PROCEDURE: COUNSELING
Detail Level: Generalized
Detail Level: Detailed
Cleanser Recommendations: DHS Zinc Cleanser\\nCLN cleanser\\nMay alternate
Patient Specific Counseling (Will Not Stick From Patient To Patient): I recommended the following:\\nSunscreen

## 2024-06-12 DIAGNOSIS — I10 ESSENTIAL HYPERTENSION, BENIGN: ICD-10-CM

## 2024-06-12 DIAGNOSIS — F41.1 GENERALIZED ANXIETY DISORDER: ICD-10-CM

## 2024-06-12 RX ORDER — SERTRALINE HYDROCHLORIDE 50 MG/1
TABLET, FILM COATED ORAL
Qty: 45 TABLET | Refills: 1 | Status: SHIPPED | OUTPATIENT
Start: 2024-06-12 | End: 2024-08-15

## 2024-06-12 RX ORDER — LABETALOL 200 MG/1
200 TABLET, FILM COATED ORAL 2 TIMES DAILY
Qty: 60 TABLET | Refills: 1 | Status: SHIPPED | OUTPATIENT
Start: 2024-06-12 | End: 2024-08-15

## 2024-06-12 NOTE — TELEPHONE ENCOUNTER
"Medicine Refill Request    Last Office Visit: 1/5/2024   Last Consult Visit: Visit date not found  Last Telemedicine Visit: Visit date not found    Next Appointment: Visit date not found      Current Outpatient Medications:     labetaloL (NORMODYNE) 200 mg tablet, take 1 tablet by mouth twice a day, Disp: 60 tablet, Rfl: 3    sertraline (ZOLOFT) 50 mg tablet, take 1 and 1/2 tablets by mouth at bedtime, Disp: 45 tablet, Rfl: 3      BP Readings from Last 3 Encounters:   01/05/24 140/80   11/03/23 120/80   02/03/23 132/88       Recent Lab results:  Lab Results   Component Value Date    CHOL 271 (H) 04/10/2023   ,   Lab Results   Component Value Date    HDL 50 04/10/2023   ,   Lab Results   Component Value Date    LDLCALC 176 (H) 04/10/2023   ,   Lab Results   Component Value Date    TRIG 240 (H) 04/10/2023        Lab Results   Component Value Date    GLUCOSE 105 (H) 04/10/2023   , No results found for: \"HGBA1C\"      Lab Results   Component Value Date    CREATININE 0.93 04/10/2023       Lab Results   Component Value Date    TSH 1.520 04/10/2023         No results found for: \"HGBA1C\"  "

## 2024-07-09 ENCOUNTER — OFFICE VISIT (OUTPATIENT)
Dept: PRIMARY CARE | Facility: CLINIC | Age: 44
End: 2024-07-09
Payer: COMMERCIAL

## 2024-07-09 VITALS
BODY MASS INDEX: 29.48 KG/M2 | RESPIRATION RATE: 18 BRPM | TEMPERATURE: 98 F | WEIGHT: 160.2 LBS | DIASTOLIC BLOOD PRESSURE: 82 MMHG | SYSTOLIC BLOOD PRESSURE: 138 MMHG | OXYGEN SATURATION: 99 % | HEART RATE: 70 BPM | HEIGHT: 62 IN

## 2024-07-09 DIAGNOSIS — E78.00 HYPERCHOLESTEROLEMIA: ICD-10-CM

## 2024-07-09 DIAGNOSIS — F41.1 GENERALIZED ANXIETY DISORDER: ICD-10-CM

## 2024-07-09 DIAGNOSIS — I10 ESSENTIAL HYPERTENSION, BENIGN: Primary | ICD-10-CM

## 2024-07-09 DIAGNOSIS — Z23 NEED FOR VACCINATION: ICD-10-CM

## 2024-07-09 PROBLEM — J01.00 ACUTE NON-RECURRENT MAXILLARY SINUSITIS: Status: RESOLVED | Noted: 2024-01-05 | Resolved: 2024-07-09

## 2024-07-09 PROCEDURE — 99214 OFFICE O/P EST MOD 30 MIN: CPT | Mod: 25 | Performed by: NURSE PRACTITIONER

## 2024-07-09 PROCEDURE — 3075F SYST BP GE 130 - 139MM HG: CPT | Performed by: NURSE PRACTITIONER

## 2024-07-09 PROCEDURE — 3008F BODY MASS INDEX DOCD: CPT | Performed by: NURSE PRACTITIONER

## 2024-07-09 PROCEDURE — 90471 IMMUNIZATION ADMIN: CPT | Performed by: NURSE PRACTITIONER

## 2024-07-09 PROCEDURE — 3079F DIAST BP 80-89 MM HG: CPT | Performed by: NURSE PRACTITIONER

## 2024-07-09 PROCEDURE — 90716 VAR VACCINE LIVE SUBQ: CPT | Performed by: NURSE PRACTITIONER

## 2024-07-09 ASSESSMENT — ENCOUNTER SYMPTOMS
PALPITATIONS: 0
SHORTNESS OF BREATH: 0
DEPRESSED MOOD: 0
HYPERTENSION: 1
MYALGIAS: 0
NERVOUS/ANXIOUS: 1

## 2024-07-09 ASSESSMENT — PATIENT HEALTH QUESTIONNAIRE - PHQ9: SUM OF ALL RESPONSES TO PHQ9 QUESTIONS 1 & 2: 0

## 2024-07-09 ASSESSMENT — PAIN SCALES - GENERAL: PAINLEVEL: 0-NO PAIN

## 2024-07-09 NOTE — PROGRESS NOTES
[facility] [place]  [place] [address]  [place] [address]  [phone]  Fax [phone]      Patient ID: Ro Beck                              : 1980    Visit Date: 2024    Chief Complaint: Med Management         Patient ID: Ro Beck is a 43 y.o. female.    Patient Active Problem List   Diagnosis    Essential hypertension, benign    Generalized anxiety disorder    Routine physical examination    Hypercholesterolemia         Current Outpatient Medications:     labetaloL (NORMODYNE) 200 mg tablet, take 1 tablet by mouth twice a day, Disp: 60 tablet, Rfl: 1    sertraline (ZOLOFT) 50 mg tablet, take 1 and 1/2 tablets by mouth at bedtime, Disp: 45 tablet, Rfl: 1    No Known Allergies    Social History     Tobacco Use    Smoking status: Never    Smokeless tobacco: Never       Health Maintenance   Topic Date Due    Influenza Vaccine (1) 2024    Cervical Cancer Screening  2024    Breast Cancer Screening  10/02/2024    Depression Screening  2025    DTaP, Tdap, and Td Vaccines (4 - Td or Tdap) 2032    Zoster Vaccine (1 of 2) 2032    COVID-19 Vaccine  Completed    Meningococcal ACWY  Aged Out    RSV <20 months  Aged Out    HIB Vaccines  Aged Out    IPV Vaccines  Aged Out    HPV Vaccines  Aged Out    Pneumococcal  Aged Out    Hepatitis B Vaccines  Discontinued    HIV Screening  Discontinued    Hepatitis C Screening  Discontinued       HPI  Med check.  Needs varicella vaccine    Hypertension  This is a chronic problem. The current episode started more than 1 year ago. The problem is unchanged. The problem is controlled. Associated symptoms include anxiety. Pertinent negatives include no chest pain, palpitations, peripheral edema or shortness of breath. There are no associated agents to hypertension. Past treatments include beta blockers. The current treatment provides significant improvement. There are no  "compliance problems.  There is no history of chronic renal disease.   Hyperlipidemia  This is a chronic problem. The current episode started more than 1 year ago. Recent lipid tests were reviewed and are high. She has no history of chronic renal disease, hypothyroidism, liver disease, obesity or nephrotic syndrome. There are no known factors aggravating her hyperlipidemia. Pertinent negatives include no chest pain, myalgias or shortness of breath. Current antihyperlipidemic treatment includes diet change and exercise. There are no compliance problems.    Anxiety  Presents for follow-up visit. Symptoms include excessive worry and nervous/anxious behavior. Patient reports no chest pain, depressed mood, palpitations, shortness of breath or suicidal ideas. Symptoms occur occasionally. The severity of symptoms is mild.     Compliance with medications is % (on Sertraline). Treatment side effects: none.       The following have been reviewed and updated as appropriate in this visit:   Allergies  Meds  Problems         Review of System  Review of Systems   Respiratory:  Negative for shortness of breath.    Cardiovascular:  Negative for chest pain and palpitations.   Musculoskeletal:  Negative for myalgias.   Psychiatric/Behavioral:  Negative for suicidal ideas. The patient is nervous/anxious.        Objective     Vitals  Vitals:    07/09/24 1114   BP: 138/82   BP Location: Left upper arm   Patient Position: Sitting   Pulse: 70   Resp: 18   Temp: 36.7 °C (98 °F)   TempSrc: Temporal   SpO2: 99%   Weight: 72.7 kg (160 lb 3.2 oz)   Height: 1.575 m (5' 2.01\")     Body mass index is 29.29 kg/m².      Physical Exam  Vitals reviewed.   Constitutional:       General: She is not in acute distress.     Appearance: Normal appearance. She is not ill-appearing, toxic-appearing or diaphoretic.   Cardiovascular:      Rate and Rhythm: Normal rate and regular rhythm.      Heart sounds: No murmur heard.     No friction rub. No " gallop.   Pulmonary:      Effort: Pulmonary effort is normal.      Breath sounds: Normal breath sounds. No wheezing, rhonchi or rales.   Musculoskeletal:      Right lower leg: No edema.      Left lower leg: No edema.   Neurological:      Mental Status: She is alert and oriented to person, place, and time.         Assessment/Plan     Problem List Items Addressed This Visit       Essential hypertension, benign - Primary     Continue current med  Follow up 6 mo  Labs ordered.         Relevant Orders    CBC and Differential    Comprehensive metabolic panel    Urinalysis with Reflex Culture (ED and Outpatient only)    Generalized anxiety disorder     Stable on Sertraline  Continue med  Follow up 6 months         Hypercholesterolemia     Check labs.  Lowfat low chol diet  Regular exercise.         Relevant Orders    Comprehensive metabolic panel    Lipid panel     Other Visit Diagnoses       Need for vaccination        Relevant Orders    Varicella vaccine subcutaneous (Completed)          6-8 week follow up with RN for 2nd varicella vaccine  6 mo follow up PE with me      CHOCO Laws  7/9/2024

## 2024-07-12 ENCOUNTER — TELEPHONE (OUTPATIENT)
Dept: PRIMARY CARE | Facility: CLINIC | Age: 44
End: 2024-07-12
Payer: COMMERCIAL

## 2024-07-12 DIAGNOSIS — Z23 NEED FOR VACCINATION: Primary | ICD-10-CM

## 2024-07-24 ENCOUNTER — TELEMEDICINE (OUTPATIENT)
Dept: PRIMARY CARE | Facility: CLINIC | Age: 44
End: 2024-07-24
Payer: COMMERCIAL

## 2024-07-24 DIAGNOSIS — H10.30 ACUTE CONJUNCTIVITIS, UNSPECIFIED ACUTE CONJUNCTIVITIS TYPE, UNSPECIFIED LATERALITY: ICD-10-CM

## 2024-07-24 DIAGNOSIS — J01.00 ACUTE NON-RECURRENT MAXILLARY SINUSITIS: Primary | ICD-10-CM

## 2024-07-24 PROCEDURE — 99213 OFFICE O/P EST LOW 20 MIN: CPT | Mod: 95 | Performed by: NURSE PRACTITIONER

## 2024-07-24 RX ORDER — AMOXICILLIN 875 MG/1
875 TABLET, FILM COATED ORAL 2 TIMES DAILY
Qty: 20 TABLET | Refills: 0 | Status: SHIPPED | OUTPATIENT
Start: 2024-07-24 | End: 2024-08-03

## 2024-07-24 RX ORDER — MOXIFLOXACIN 5 MG/ML
SOLUTION/ DROPS OPHTHALMIC
Qty: 3 ML | Refills: 0 | Status: SHIPPED | OUTPATIENT
Start: 2024-07-24 | End: 2025-01-14 | Stop reason: ALTCHOICE

## 2024-07-24 ASSESSMENT — ENCOUNTER SYMPTOMS
HEADACHES: 1
HOARSE VOICE: 0
CHILLS: 0
NECK PAIN: 0
SWOLLEN GLANDS: 0
COUGH: 1
DIAPHORESIS: 0
EYE DISCHARGE: 1
SINUS PRESSURE: 1
EYE REDNESS: 1
SORE THROAT: 1
SINUS COMPLAINT: 1
SHORTNESS OF BREATH: 0

## 2024-07-24 NOTE — PROGRESS NOTES
Verification of Patient Location:  The patient affirms they are currently located in the following state: [place]    Request for Consent:    Audio and Video Encounter   Hello, my name is CHOCO Laws.  Before we proceed, can you please verify your identification by telling me your full name and date of birth?  Can you tell me who is in the room with you?    You and I are about to have a telemedicine check-in or visit because you have requested it.  This is a live video-conference.  I am a real person, speaking to you in real time.  There is no one else with me on the video-conference. I am not recording this conversation and I am asking you not to record it.  This telemedicine visit will be billed to your health insurance or you, if you are self-insured.  You understand you will be responsible for any copayments or coinsurances that apply to your telemedicine visit.  Communication platform used for this encounter:  [employer] Video Visit (Epic Video Client)       Before starting our telemedicine visit, I am required to get your consent for this virtual check-in or visit by telemedicine. Do you consent?    Patient Response to Request for Consent:  Yes    Patient Active Problem List   Diagnosis    Essential hypertension, benign    Generalized anxiety disorder    Routine physical examination    Hypercholesterolemia    Acute non-recurrent maxillary sinusitis    Acute conjunctivitis     Current Outpatient Medications on File Prior to Visit   Medication Sig Dispense Refill    labetaloL (NORMODYNE) 200 mg tablet take 1 tablet by mouth twice a day 60 tablet 1    sertraline (ZOLOFT) 50 mg tablet take 1 and 1/2 tablets by mouth at bedtime 45 tablet 1     No current facility-administered medications on file prior to visit.     No Known Allergies  Social History     Tobacco Use    Smoking status: Never    Smokeless tobacco: Never     Health Maintenance   Topic Date Due    Influenza Vaccine (1) 08/01/2024    Cervical  Cancer Screening  08/30/2024    Breast Cancer Screening  10/02/2024    Depression Screening  07/09/2025    DTaP, Tdap, and Td Vaccines (4 - Td or Tdap) 03/22/2032    Zoster Vaccine (1 of 2) 09/19/2032    COVID-19 Vaccine  Completed    Meningococcal ACWY  Aged Out    RSV <20 months  Aged Out    HIB Vaccines  Aged Out    IPV Vaccines  Aged Out    HPV Vaccines  Aged Out    Pneumococcal  Aged Out    Hepatitis B Vaccines  Discontinued    HIV Screening  Discontinued    Hepatitis C Screening  Discontinued       Visit Documentation:  Subjective     Patient ID: Ro Beck is a 43 y.o. female.  1980      Started over a week ago with sinus/cold symptoms  One day of low fever.  Symptoms persistent with worsening sinus pressure/pain  Now eye symptoms with discharge and redness too.  Did not test for COVID     Sinus Problem  This is a new problem. The current episode started 1 to 4 weeks ago. The problem is unchanged. The maximum temperature recorded prior to her arrival was 100.4 - 100.9 F. The fever has been present for Less than 1 day. Associated symptoms include congestion, coughing, ear pain, headaches, sinus pressure and a sore throat. Pertinent negatives include no chills, diaphoresis, hoarse voice, neck pain, shortness of breath, sneezing or swollen glands. Treatments tried: NSAID. The treatment provided mild relief.   Conjunctivitis   The current episode started yesterday. The problem has been unchanged. The problem is mild. Associated symptoms include congestion, ear pain, headaches, sore throat, cough, eye discharge and eye redness. Pertinent negatives include no swollen glands and no neck pain. The eyelid exhibits no abnormality.       The following have been reviewed and updated as appropriate in this visit:        Review of Systems   Constitutional:  Negative for chills and diaphoresis.   HENT:  Positive for congestion, ear pain, sinus pressure and sore throat. Negative for hoarse voice and sneezing.     Eyes:  Positive for discharge and redness.   Respiratory:  Positive for cough. Negative for shortness of breath.    Musculoskeletal:  Negative for neck pain.   Neurological:  Positive for headaches.     Physical Exam  Constitutional:       General: She is not in acute distress.     Appearance: Normal appearance. She is not ill-appearing, toxic-appearing or diaphoretic.   HENT:      Nose: Congestion present.   Pulmonary:      Effort: Pulmonary effort is normal. No respiratory distress.   Neurological:      Mental Status: She is alert and oriented to person, place, and time.   Psychiatric:         Mood and Affect: Mood and affect normal.         Speech: Speech normal.         Behavior: Behavior normal.          Assessment/Plan   Diagnoses and all orders for this visit:    Acute non-recurrent maxillary sinusitis (Primary)  Assessment & Plan:  Amox BID #20  Push po fluids  Saline NS  Ibuprofen PRN  Follow up if symptoms worsen/persist.      Orders:  -     amoxicillin (AMOXIL) 875 mg tablet; Take 1 tablet (875 mg total) by mouth 2 (two) times a day for 10 days.    Acute conjunctivitis, unspecified acute conjunctivitis type, unspecified laterality  Assessment & Plan:  Vigamox as directed x 7 days  Follow up if symptoms worsen/persist.      Orders:  -     moxifloxacin (VIGAMOX) 0.5 % ophthalmic solution; 1 gtt affected eye TID x 7 days        Time Spent:  I spent 20 minutes on this date of service performing the following activities: obtaining history, performing examination, entering orders, documenting, preparing for visit, obtaining / reviewing records, and providing counseling and education.

## 2024-08-11 LAB
ALBUMIN SERPL-MCNC: 4.4 G/DL (ref 3.9–4.9)
ALP SERPL-CCNC: 71 IU/L (ref 44–121)
ALT SERPL-CCNC: 11 IU/L (ref 0–32)
APPEARANCE UR: ABNORMAL
AST SERPL-CCNC: 14 IU/L (ref 0–40)
BACTERIA #/AREA URNS HPF: NORMAL /[HPF]
BASOPHILS # BLD AUTO: 0 X10E3/UL (ref 0–0.2)
BASOPHILS NFR BLD AUTO: 1 %
BILIRUB SERPL-MCNC: 0.5 MG/DL (ref 0–1.2)
BILIRUB UR QL STRIP: NEGATIVE
BUN SERPL-MCNC: 10 MG/DL (ref 6–24)
BUN/CREAT SERPL: 10 (ref 9–23)
CALCIUM SERPL-MCNC: 9.6 MG/DL (ref 8.7–10.2)
CASTS URNS QL MICRO: NORMAL /LPF
CHLORIDE SERPL-SCNC: 103 MMOL/L (ref 96–106)
CHOLEST SERPL-MCNC: 257 MG/DL (ref 100–199)
CO2 SERPL-SCNC: 22 MMOL/L (ref 20–29)
COLOR UR: YELLOW
CREAT SERPL-MCNC: 1.04 MG/DL (ref 0.57–1)
EGFRCR SERPLBLD CKD-EPI 2021: 69 ML/MIN/1.73
EOSINOPHIL # BLD AUTO: 0.2 X10E3/UL (ref 0–0.4)
EOSINOPHIL NFR BLD AUTO: 3 %
EPI CELLS #/AREA URNS HPF: NORMAL /HPF (ref 0–10)
ERYTHROCYTE [DISTWIDTH] IN BLOOD BY AUTOMATED COUNT: 12.3 % (ref 11.7–15.4)
GLOBULIN SER CALC-MCNC: 2.5 G/DL (ref 1.5–4.5)
GLUCOSE SERPL-MCNC: 104 MG/DL (ref 70–99)
GLUCOSE UR QL STRIP: NEGATIVE
HCT VFR BLD AUTO: 38.2 % (ref 34–46.6)
HDLC SERPL-MCNC: 45 MG/DL
HGB BLD-MCNC: 12.5 G/DL (ref 11.1–15.9)
HGB UR QL STRIP: NEGATIVE
IMM GRANULOCYTES # BLD AUTO: 0 X10E3/UL (ref 0–0.1)
IMM GRANULOCYTES NFR BLD AUTO: 0 %
KETONES UR QL STRIP: NEGATIVE
LAB CORP URINALYSIS REFLEX: ABNORMAL
LDLC SERPL CALC-MCNC: 181 MG/DL (ref 0–99)
LEUKOCYTE ESTERASE UR QL STRIP: NEGATIVE
LYMPHOCYTES # BLD AUTO: 2.9 X10E3/UL (ref 0.7–3.1)
LYMPHOCYTES NFR BLD AUTO: 44 %
MCH RBC QN AUTO: 29.1 PG (ref 26.6–33)
MCHC RBC AUTO-ENTMCNC: 32.7 G/DL (ref 31.5–35.7)
MCV RBC AUTO: 89 FL (ref 79–97)
MICRO URNS: ABNORMAL
MICRO URNS: ABNORMAL
MONOCYTES # BLD AUTO: 0.6 X10E3/UL (ref 0.1–0.9)
MONOCYTES NFR BLD AUTO: 9 %
NEUTROPHILS # BLD AUTO: 2.8 X10E3/UL (ref 1.4–7)
NEUTROPHILS NFR BLD AUTO: 43 %
NITRITE UR QL STRIP: NEGATIVE
PH UR STRIP: 5.5 [PH] (ref 5–7.5)
PLATELET # BLD AUTO: 232 X10E3/UL (ref 150–450)
POTASSIUM SERPL-SCNC: 5 MMOL/L (ref 3.5–5.2)
PROT SERPL-MCNC: 6.9 G/DL (ref 6–8.5)
PROT UR QL STRIP: ABNORMAL
RBC # BLD AUTO: 4.29 X10E6/UL (ref 3.77–5.28)
RBC #/AREA URNS HPF: NORMAL /HPF (ref 0–2)
SODIUM SERPL-SCNC: 139 MMOL/L (ref 134–144)
SP GR UR STRIP: 1.02 (ref 1–1.03)
TRIGL SERPL-MCNC: 168 MG/DL (ref 0–149)
UROBILINOGEN UR STRIP-MCNC: 0.2 MG/DL (ref 0.2–1)
VLDLC SERPL CALC-MCNC: 31 MG/DL (ref 5–40)
WBC # BLD AUTO: 6.6 X10E3/UL (ref 3.4–10.8)
WBC #/AREA URNS HPF: NORMAL /HPF (ref 0–5)

## 2024-08-15 DIAGNOSIS — I10 ESSENTIAL HYPERTENSION, BENIGN: ICD-10-CM

## 2024-08-15 DIAGNOSIS — F41.1 GENERALIZED ANXIETY DISORDER: ICD-10-CM

## 2024-08-15 RX ORDER — LABETALOL 200 MG/1
200 TABLET, FILM COATED ORAL 2 TIMES DAILY
Qty: 60 TABLET | Refills: 5 | Status: SHIPPED | OUTPATIENT
Start: 2024-08-15

## 2024-08-15 RX ORDER — SERTRALINE HYDROCHLORIDE 50 MG/1
TABLET, FILM COATED ORAL
Qty: 45 TABLET | Refills: 5 | Status: SHIPPED | OUTPATIENT
Start: 2024-08-15 | End: 2025-02-05

## 2024-08-15 NOTE — TELEPHONE ENCOUNTER
"Medicine Refill Request    Last Office Visit: 7/9/2024   Last Consult Visit: Visit date not found  Last Telemedicine Visit: 7/24/2024 Beronica Alaniz CRNP    Next Appointment: 8/20/2024      Current Outpatient Medications:     labetaloL (NORMODYNE) 200 mg tablet, take 1 tablet by mouth twice a day, Disp: 60 tablet, Rfl: 1    moxifloxacin (VIGAMOX) 0.5 % ophthalmic solution, 1 gtt affected eye TID x 7 days, Disp: 3 mL, Rfl: 0    sertraline (ZOLOFT) 50 mg tablet, take 1 and 1/2 tablets by mouth at bedtime, Disp: 45 tablet, Rfl: 1      BP Readings from Last 3 Encounters:   07/09/24 138/82   01/05/24 140/80   11/03/23 120/80       Recent Lab results:  Lab Results   Component Value Date    CHOL 257 (H) 08/10/2024   ,   Lab Results   Component Value Date    HDL 45 08/10/2024   ,   Lab Results   Component Value Date    LDLCALC 181 (H) 08/10/2024   ,   Lab Results   Component Value Date    TRIG 168 (H) 08/10/2024        Lab Results   Component Value Date    GLUCOSE 104 (H) 08/10/2024   , No results found for: \"HGBA1C\"      Lab Results   Component Value Date    CREATININE 1.04 (H) 08/10/2024       Lab Results   Component Value Date    TSH 1.520 04/10/2023         No results found for: \"HGBA1C\"  "

## 2024-08-20 ENCOUNTER — CLINICAL SUPPORT (OUTPATIENT)
Dept: PRIMARY CARE | Facility: CLINIC | Age: 44
End: 2024-08-20
Payer: COMMERCIAL

## 2024-08-20 DIAGNOSIS — Z23 NEED FOR VACCINATION: Primary | ICD-10-CM

## 2024-08-20 PROCEDURE — 90716 VAR VACCINE LIVE SUBQ: CPT | Performed by: NURSE PRACTITIONER

## 2024-08-20 PROCEDURE — 90471 IMMUNIZATION ADMIN: CPT | Performed by: NURSE PRACTITIONER

## 2025-01-14 ENCOUNTER — OFFICE VISIT (OUTPATIENT)
Dept: PRIMARY CARE | Facility: CLINIC | Age: 45
End: 2025-01-14
Payer: COMMERCIAL

## 2025-01-14 VITALS
HEART RATE: 64 BPM | TEMPERATURE: 97.6 F | WEIGHT: 165.2 LBS | DIASTOLIC BLOOD PRESSURE: 78 MMHG | HEIGHT: 62 IN | RESPIRATION RATE: 17 BRPM | SYSTOLIC BLOOD PRESSURE: 114 MMHG | BODY MASS INDEX: 30.4 KG/M2 | OXYGEN SATURATION: 98 %

## 2025-01-14 DIAGNOSIS — Z00.00 ROUTINE PHYSICAL EXAMINATION: Primary | ICD-10-CM

## 2025-01-14 DIAGNOSIS — F41.1 GENERALIZED ANXIETY DISORDER: ICD-10-CM

## 2025-01-14 DIAGNOSIS — I10 ESSENTIAL HYPERTENSION, BENIGN: ICD-10-CM

## 2025-01-14 DIAGNOSIS — E78.00 HYPERCHOLESTEROLEMIA: ICD-10-CM

## 2025-01-14 PROBLEM — J01.00 ACUTE NON-RECURRENT MAXILLARY SINUSITIS: Status: RESOLVED | Noted: 2024-01-05 | Resolved: 2025-01-14

## 2025-01-14 PROBLEM — H10.30 ACUTE CONJUNCTIVITIS: Status: RESOLVED | Noted: 2024-07-24 | Resolved: 2025-01-14

## 2025-01-14 PROCEDURE — 3074F SYST BP LT 130 MM HG: CPT | Performed by: NURSE PRACTITIONER

## 2025-01-14 PROCEDURE — 3078F DIAST BP <80 MM HG: CPT | Performed by: NURSE PRACTITIONER

## 2025-01-14 PROCEDURE — 99396 PREV VISIT EST AGE 40-64: CPT | Performed by: NURSE PRACTITIONER

## 2025-01-14 PROCEDURE — 3008F BODY MASS INDEX DOCD: CPT | Performed by: NURSE PRACTITIONER

## 2025-01-14 RX ORDER — VALSARTAN 80 MG/1
80 TABLET ORAL DAILY
Qty: 90 TABLET | Refills: 1 | Status: SHIPPED | OUTPATIENT
Start: 2025-01-14 | End: 2025-07-13

## 2025-01-14 RX ORDER — VALSARTAN 80 MG/1
80 TABLET ORAL DAILY
Qty: 90 TABLET | Refills: 1 | Status: SHIPPED | OUTPATIENT
Start: 2025-01-14 | End: 2025-01-14 | Stop reason: SDUPTHER

## 2025-01-14 SDOH — ECONOMIC STABILITY: TRANSPORTATION INSECURITY
IN THE PAST 12 MONTHS, HAS THE LACK OF TRANSPORTATION KEPT YOU FROM MEDICAL APPOINTMENTS OR FROM GETTING MEDICATIONS?: NO

## 2025-01-14 SDOH — ECONOMIC STABILITY: INCOME INSECURITY: IN THE LAST 12 MONTHS, WAS THERE A TIME WHEN YOU WERE NOT ABLE TO PAY THE MORTGAGE OR RENT ON TIME?: NO

## 2025-01-14 SDOH — ECONOMIC STABILITY: FOOD INSECURITY: WITHIN THE PAST 12 MONTHS, THE FOOD YOU BOUGHT JUST DIDN'T LAST AND YOU DIDN'T HAVE MONEY TO GET MORE.: NEVER TRUE

## 2025-01-14 SDOH — ECONOMIC STABILITY: FOOD INSECURITY: WITHIN THE PAST 12 MONTHS, YOU WORRIED THAT YOUR FOOD WOULD RUN OUT BEFORE YOU GOT MONEY TO BUY MORE.: NEVER TRUE

## 2025-01-14 SDOH — ECONOMIC STABILITY: TRANSPORTATION INSECURITY
IN THE PAST 12 MONTHS, HAS LACK OF TRANSPORTATION KEPT YOU FROM MEETINGS, WORK, OR FROM GETTING THINGS NEEDED FOR DAILY LIVING?: NO

## 2025-01-14 ASSESSMENT — ENCOUNTER SYMPTOMS
EYES NEGATIVE: 1
ENDOCRINE NEGATIVE: 1
MUSCULOSKELETAL NEGATIVE: 1
ALLERGIC/IMMUNOLOGIC NEGATIVE: 1
NEUROLOGICAL NEGATIVE: 1
CARDIOVASCULAR NEGATIVE: 1
RESPIRATORY NEGATIVE: 1
GASTROINTESTINAL NEGATIVE: 1
HEMATOLOGIC/LYMPHATIC NEGATIVE: 1
PSYCHIATRIC NEGATIVE: 1
CONSTITUTIONAL NEGATIVE: 1

## 2025-01-14 ASSESSMENT — PAIN SCALES - GENERAL: PAINLEVEL_OUTOF10: 0-NO PAIN

## 2025-01-14 ASSESSMENT — SOCIAL DETERMINANTS OF HEALTH (SDOH): IN THE PAST 12 MONTHS, HAS THE ELECTRIC, GAS, OIL, OR WATER COMPANY THREATENED TO SHUT OFF SERVICE IN YOUR HOME?: NO

## 2025-01-14 NOTE — ASSESSMENT & PLAN NOTE
Week 1-- labetalol daily x 7 days   Week 2--1/2 Labetolol and start Valsartan.  Monitor BP @ home  Goal < 135/85  Follow up 6 mo

## 2025-01-14 NOTE — PROGRESS NOTES
[facility]  [place] [address]  [facility] [address]  [phone]  Fax [phone]      Patient ID: Ro Beck                              : 1980    Visit Date: 2025    Chief Complaint: Annual Exam      Current Outpatient Medications   Medication Sig Dispense Refill    sertraline (ZOLOFT) 50 mg tablet take 1 and 1/2 tablets by mouth at bedtime 45 tablet 5    valsartan (DIOVAN) 80 mg tablet Take 1 tablet (80 mg total) by mouth daily. 90 tablet 1    labetaloL (NORMODYNE) 200 mg tablet take 1 tablet by mouth twice a day 60 tablet 5     No current facility-administered medications for this visit.       HPI  Routine PE  BP med check        No Known Allergies    Past Surgical History   Procedure Laterality Date    Dilation and evacuation  2020    [place] tooth extraction             Health Maintenance   Topic Date Due    Cervical Cancer Screening  2024    Breast Cancer Screening  10/02/2024    COVID-19 Vaccine ( season) 2026 (Originally 12/10/2024)    Depression Screening  2025    DTaP, Tdap, and Td Vaccines (4 - Td or Tdap) 2032    Zoster Vaccine (1 of 2) 2032    RSV Vaccine (1 - 1-dose 75+ series) 2057    Influenza Vaccine  Completed    Meningococcal ACWY  Aged Out    RSV <20 months  Aged Out    HIB Vaccines  Aged Out    IPV Vaccines  Aged Out    Meningococcal B  Aged Out    HPV Vaccines  Aged Out    Pneumococcal  Aged Out    Hepatitis B Vaccines  Discontinued    HIV Screening  Discontinued    Hepatitis C Screening  Discontinued       Social History     Socioeconomic History    Marital status:      Spouse name: None    Number of children: None    Years of education: None    Highest education level: None   Tobacco Use    Smoking status: Never    Smokeless tobacco: Never     Social Drivers of Health     Food Insecurity: No Food Insecurity (2025)    Hunger Vital Sign      "Worried About Running Out of Food in the Last Year: Never true     Ran Out of Food in the Last Year: Never true   Transportation Needs: No Transportation Needs (1/14/2025)    PRAPARE - Transportation     Lack of Transportation (Medical): No     Lack of Transportation (Non-Medical): No   Housing Stability: Low Risk  (1/14/2025)    Housing Stability Vital Sign     Unable to Pay for Housing in the Last Year: No     Number of Times Moved in the Last Year: 0     Homeless in the Last Year: No       Family History   Problem Relation Name Age of Onset    Uterine cancer Biological Mother      Skin cancer Biological Father      No Known Problems Biological Sister      Alzheimer's disease Maternal Grandmother         Review Of Systems  Review of Systems   Constitutional: Negative.    HENT: Negative.     Eyes: Negative.    Respiratory: Negative.     Cardiovascular: Negative.    Gastrointestinal: Negative.    Endocrine: Negative.    Genitourinary: Negative.    Musculoskeletal: Negative.    Skin: Negative.    Allergic/Immunologic: Negative.    Neurological: Negative.    Hematological: Negative.    Psychiatric/Behavioral: Negative.          Vitals:    01/14/25 1345   BP: 114/78   BP Location: Left upper arm   Patient Position: Sitting   Pulse: 64   Resp: 17   Temp: 36.4 °C (97.6 °F)   TempSrc: Temporal   SpO2: 98%   Weight: 74.9 kg (165 lb 3.2 oz)   Height: 1.575 m (5' 2.01\")       Body mass index is 30.21 kg/m².      Physical Exam  Vitals reviewed.   Constitutional:       General: She is not in acute distress.     Appearance: Normal appearance. She is not ill-appearing, toxic-appearing or diaphoretic.   HENT:      Head: Normocephalic.      Right Ear: Tympanic membrane, ear canal and external ear normal.      Left Ear: Tympanic membrane and ear canal normal.      Nose: Nose normal.      Mouth/Throat:      Mouth: Mucous membranes are moist.      Pharynx: Oropharynx is clear. No oropharyngeal exudate or posterior oropharyngeal " erythema.   Eyes:      Extraocular Movements: Extraocular movements intact.      Conjunctiva/sclera: Conjunctivae normal.      Pupils: Pupils are equal, round, and reactive to light.   Cardiovascular:      Rate and Rhythm: Normal rate and regular rhythm.      Heart sounds: No murmur heard.     No friction rub. No gallop.   Pulmonary:      Effort: Pulmonary effort is normal.      Breath sounds: Normal breath sounds. No wheezing, rhonchi or rales.   Abdominal:      General: Bowel sounds are normal. There is no distension.      Palpations: Abdomen is soft. There is no mass.      Tenderness: There is no abdominal tenderness. There is no right CVA tenderness or left CVA tenderness.   Musculoskeletal:      Cervical back: Neck supple. No rigidity or tenderness.      Right lower leg: No edema.      Left lower leg: No edema.   Lymphadenopathy:      Cervical: No cervical adenopathy.   Skin:     General: Skin is warm and dry.      Coloration: Skin is not pale.      Findings: No erythema or rash.   Neurological:      General: No focal deficit present.      Mental Status: She is alert and oriented to person, place, and time.      Gait: Gait normal.      Deep Tendon Reflexes: Reflexes normal.   Psychiatric:         Mood and Affect: Mood and affect normal.         Speech: Speech normal.         Behavior: Behavior normal.         Thought Content: Thought content does not include suicidal ideation. Thought content does not include suicidal plan.         Assessment/Plan     Problem List Items Addressed This Visit       Essential hypertension, benign    Current Assessment & Plan     Week 1-- labetalol daily x 7 days   Week 2--1/2 Labetolol and start Valsartan.  Monitor BP @ home  Goal < 135/85  Follow up 6 mo         Relevant Medications    valsartan (DIOVAN) 80 mg tablet    Generalized anxiety disorder    Current Assessment & Plan     Stable on Sertraline QD  Continue med  Follow up 6 months         Routine physical examination -  Primary    Current Assessment & Plan      Latest Reference Range & Units 08/10/24 07:53   Sodium 134 - 144 mmol/L 139   Potassium, Bld 3.5 - 5.2 mmol/L 5.0   Chloride 96 - 106 mmol/L 103   CO2 20 - 29 mmol/L 22   BUN 6 - 24 mg/dL 10   Creatinine 0.57 - 1.00 mg/dL 1.04 (H)   Glucose 70 - 99 mg/dL 104 (H)   Calcium 8.7 - 10.2 mg/dL 9.6   AST (SGOT) 0 - 40 IU/L 14   ALT (SGPT) 0 - 32 IU/L 11   Alkaline Phosphatase 44 - 121 IU/L 71   Total Protein 6.0 - 8.5 g/dL 6.9   Albumin 3.9 - 4.9 g/dL 4.4   Bilirubin, Total 0.0 - 1.2 mg/dL 0.5   eGFR >59 mL/min/1.73 69   Bun/Creatinine Ratio 9 - 23  10   Globulin 1.5 - 4.5 g/dL 2.5   (H): Data is abnormally high   Latest Reference Range & Units 08/10/24 07:53   WBC 3.4 - 10.8 x10E3/uL 6.6   RBC 3.77 - 5.28 x10E6/uL 4.29   Hemoglobin 11.1 - 15.9 g/dL 12.5   Hematocrit 34.0 - 46.6 % 38.2   MCV 79 - 97 fL 89   MCH 26.6 - 33.0 pg 29.1   MCHC 31.5 - 35.7 g/dL 32.7   RDW 11.7 - 15.4 % 12.3   Platelets 150 - 450 x10E3/uL 232     Repeat labs yearly--will order at next visit in 6 months  Vaccines UTD  GYN care UTD.         Hypercholesterolemia    Current Assessment & Plan      Latest Reference Range & Units 08/10/24 07:53   Cholesterol 100 - 199 mg/dL 257 (H)   Triglycerides 0 - 149 mg/dL 168 (H)   HDL >39 mg/dL 45   LDL Calculated 0 - 99 mg/dL 181 (H)   (H): Data is abnormally high    Work on lowfat low chol diet  Regular exercise  Eat more oatmeal, olive oil, avocado, almonds/walnuts, fish.                      CHOCO Laws  1/14/2025

## 2025-01-14 NOTE — ASSESSMENT & PLAN NOTE
Latest Reference Range & Units 08/10/24 07:53   Sodium 134 - 144 mmol/L 139   Potassium, Bld 3.5 - 5.2 mmol/L 5.0   Chloride 96 - 106 mmol/L 103   CO2 20 - 29 mmol/L 22   BUN 6 - 24 mg/dL 10   Creatinine 0.57 - 1.00 mg/dL 1.04 (H)   Glucose 70 - 99 mg/dL 104 (H)   Calcium 8.7 - 10.2 mg/dL 9.6   AST (SGOT) 0 - 40 IU/L 14   ALT (SGPT) 0 - 32 IU/L 11   Alkaline Phosphatase 44 - 121 IU/L 71   Total Protein 6.0 - 8.5 g/dL 6.9   Albumin 3.9 - 4.9 g/dL 4.4   Bilirubin, Total 0.0 - 1.2 mg/dL 0.5   eGFR >59 mL/min/1.73 69   Bun/Creatinine Ratio 9 - 23  10   Globulin 1.5 - 4.5 g/dL 2.5   (H): Data is abnormally high   Latest Reference Range & Units 08/10/24 07:53   WBC 3.4 - 10.8 x10E3/uL 6.6   RBC 3.77 - 5.28 x10E6/uL 4.29   Hemoglobin 11.1 - 15.9 g/dL 12.5   Hematocrit 34.0 - 46.6 % 38.2   MCV 79 - 97 fL 89   MCH 26.6 - 33.0 pg 29.1   MCHC 31.5 - 35.7 g/dL 32.7   RDW 11.7 - 15.4 % 12.3   Platelets 150 - 450 x10E3/uL 232     Repeat labs yearly--will order at next visit in 6 months  Vaccines UTD  GYN care UTD.

## 2025-01-14 NOTE — ASSESSMENT & PLAN NOTE
Latest Reference Range & Units 08/10/24 07:53   Cholesterol 100 - 199 mg/dL 257 (H)   Triglycerides 0 - 149 mg/dL 168 (H)   HDL >39 mg/dL 45   LDL Calculated 0 - 99 mg/dL 181 (H)   (H): Data is abnormally high    Work on lowfat low chol diet  Regular exercise  Eat more oatmeal, olive oil, avocado, almonds/walnuts, fish.

## 2025-02-05 DIAGNOSIS — F41.1 GENERALIZED ANXIETY DISORDER: ICD-10-CM

## 2025-02-05 RX ORDER — SERTRALINE HYDROCHLORIDE 50 MG/1
TABLET, FILM COATED ORAL
Qty: 45 TABLET | Refills: 5 | Status: SHIPPED | OUTPATIENT
Start: 2025-02-05

## 2025-02-05 NOTE — TELEPHONE ENCOUNTER
"Medicine Refill Request    Last Office Visit: 1/14/2025   Last Consult Visit: Visit date not found  Last Telemedicine Visit: 7/24/2024 Beronica Alaniz CRNP    Next Appointment: 7/15/2025      Current Outpatient Medications:     labetaloL (NORMODYNE) 200 mg tablet, take 1 tablet by mouth twice a day, Disp: 60 tablet, Rfl: 5    sertraline (ZOLOFT) 50 mg tablet, take 1 and 1/2 tablets by mouth at bedtime, Disp: 45 tablet, Rfl: 5    valsartan (DIOVAN) 80 mg tablet, Take 1 tablet (80 mg total) by mouth daily., Disp: 90 tablet, Rfl: 1      BP Readings from Last 3 Encounters:   01/14/25 114/78   07/09/24 138/82   01/05/24 140/80       Recent Lab results:  Lab Results   Component Value Date    CHOL 257 (H) 08/10/2024   ,   Lab Results   Component Value Date    HDL 45 08/10/2024   ,   Lab Results   Component Value Date    LDLCALC 181 (H) 08/10/2024   ,   Lab Results   Component Value Date    TRIG 168 (H) 08/10/2024        Lab Results   Component Value Date    GLUCOSE 104 (H) 08/10/2024   , No results found for: \"HGBA1C\"      Lab Results   Component Value Date    CREATININE 1.04 (H) 08/10/2024       Lab Results   Component Value Date    TSH 1.520 04/10/2023         No results found for: \"HGBA1C\"  "

## 2025-04-23 ENCOUNTER — OFFICE VISIT (OUTPATIENT)
Dept: PRIMARY CARE | Facility: CLINIC | Age: 45
End: 2025-04-23
Payer: COMMERCIAL

## 2025-04-23 VITALS
WEIGHT: 163.4 LBS | HEART RATE: 81 BPM | BODY MASS INDEX: 30.07 KG/M2 | OXYGEN SATURATION: 98 % | RESPIRATION RATE: 16 BRPM | DIASTOLIC BLOOD PRESSURE: 84 MMHG | TEMPERATURE: 97.7 F | HEIGHT: 62 IN | SYSTOLIC BLOOD PRESSURE: 120 MMHG

## 2025-04-23 DIAGNOSIS — J40 SINOBRONCHITIS: Primary | ICD-10-CM

## 2025-04-23 DIAGNOSIS — J32.9 SINOBRONCHITIS: Primary | ICD-10-CM

## 2025-04-23 PROCEDURE — 3079F DIAST BP 80-89 MM HG: CPT | Performed by: NURSE PRACTITIONER

## 2025-04-23 PROCEDURE — 3074F SYST BP LT 130 MM HG: CPT | Performed by: NURSE PRACTITIONER

## 2025-04-23 PROCEDURE — 99213 OFFICE O/P EST LOW 20 MIN: CPT | Performed by: NURSE PRACTITIONER

## 2025-04-23 PROCEDURE — 3008F BODY MASS INDEX DOCD: CPT | Performed by: NURSE PRACTITIONER

## 2025-04-23 RX ORDER — AMOXICILLIN AND CLAVULANATE POTASSIUM 875; 125 MG/1; MG/1
1 TABLET, FILM COATED ORAL 2 TIMES DAILY
Qty: 20 TABLET | Refills: 0 | Status: SHIPPED | OUTPATIENT
Start: 2025-04-23 | End: 2025-05-03

## 2025-04-23 RX ORDER — PREDNISONE 10 MG/1
TABLET ORAL
Qty: 21 TABLET | Refills: 0 | Status: SHIPPED | OUTPATIENT
Start: 2025-04-23

## 2025-04-23 ASSESSMENT — ENCOUNTER SYMPTOMS
FEVER: 0
WHEEZING: 1
COUGH: 1
CHILLS: 0
MYALGIAS: 0
HEADACHES: 0
SHORTNESS OF BREATH: 1

## 2025-04-23 NOTE — PROGRESS NOTES
[facility] at [address]  [place] [address]  [phone]  Fax [phone]      Patient ID: Ro Beck                              : 1980    Visit Date: 2025    Chief Complaint: Cough (Worst @ night x1 wk)         Patient ID: Ro Beck is a 44 y.o. female.    Patient Active Problem List   Diagnosis    Essential hypertension, benign    Generalized anxiety disorder    Routine physical examination    Sinobronchitis    Hypercholesterolemia         Current Outpatient Medications:     amoxicillin-pot clavulanate (AUGMENTIN) 875-125 mg per tablet, Take 1 tablet by mouth 2 (two) times a day for 10 days., Disp: 20 tablet, Rfl: 0    predniSONE (DELTASONE) 10 mg tablet, 2 po BID x 3 then 1 po BID x 3 then 1 po QD x 3. Take with food., Disp: 21 tablet, Rfl: 0    sertraline (ZOLOFT) 50 mg tablet, take 1 and 1/2 tablets by mouth at bedtime, Disp: 45 tablet, Rfl: 5    valsartan (DIOVAN) 80 mg tablet, Take 1 tablet (80 mg total) by mouth daily., Disp: 90 tablet, Rfl: 1    No Known Allergies    Social History     Tobacco Use    Smoking status: Never    Smokeless tobacco: Never       Health Maintenance   Topic Date Due    Cervical Cancer Screening  2024    Breast Cancer Screening  10/02/2024    COVID-19 Vaccine ( season) 2026 (Originally 12/10/2024)    Depression Screening  2025    DTaP, Tdap, and Td Vaccines (4 - Td or Tdap) 2032    Zoster Vaccine (1 of 2) 2032    RSV Vaccine (1 - 1-dose 75+ series) 2057    Influenza Vaccine  Completed    Meningococcal ACWY  Aged Out    RSV <20 months  Aged Out    HIB Vaccines  Aged Out    IPV Vaccines  Aged Out    Meningococcal B  Aged Out    HPV Vaccines  Aged Out    Pneumococcal  Aged Out    Hepatitis B Vaccines  Discontinued    HIV Screening  Discontinued    Hepatitis C Screening  Discontinued       HPI  Started with cold 2 weeks ago  Persistent  "symptoms    Cough  This is a new problem. The current episode started 1 to 4 weeks ago. The problem has been unchanged. The problem occurs constantly. The cough is Productive of sputum. Associated symptoms include nasal congestion, postnasal drip, shortness of breath and wheezing. Pertinent negatives include no chest pain, chills, ear congestion, ear pain, fever, headaches or myalgias. Nothing aggravates the symptoms. She has tried nothing for the symptoms.       The following have been reviewed and updated as appropriate in this visit:          Review of System  Review of Systems   Constitutional:  Negative for chills and fever.   HENT:  Positive for postnasal drip. Negative for ear pain.    Respiratory:  Positive for cough, shortness of breath and wheezing.    Cardiovascular:  Negative for chest pain.   Musculoskeletal:  Negative for myalgias.   Neurological:  Negative for headaches.       Objective     Vitals  Vitals:    04/23/25 0802   BP: 120/84   BP Location: Left upper arm   Patient Position: Sitting   Pulse: 81   Resp: 16   Temp: 36.5 °C (97.7 °F)   TempSrc: Temporal   SpO2: 98%   Weight: 74.1 kg (163 lb 6.4 oz)   Height: 1.575 m (5' 2.01\")     Body mass index is 29.88 kg/m².    Physical Exam  Physical Exam  Vitals reviewed.   Constitutional:       General: She is not in acute distress.     Appearance: Normal appearance. She is not ill-appearing, toxic-appearing or diaphoretic.   HENT:      Right Ear: Tympanic membrane, ear canal and external ear normal.      Left Ear: Tympanic membrane, ear canal and external ear normal.      Nose: Mucosal edema and congestion present.      Mouth/Throat:      Pharynx: Posterior oropharyngeal erythema and postnasal drip present.   Cardiovascular:      Rate and Rhythm: Normal rate and regular rhythm.      Heart sounds: No murmur heard.     No friction rub. No gallop.   Pulmonary:      Effort: Pulmonary effort is normal.      Breath sounds: Wheezing present. No rhonchi or " rales.      Comments: Scattered wheezing throughout  Musculoskeletal:      Cervical back: Neck supple. No rigidity or tenderness.   Lymphadenopathy:      Cervical: No cervical adenopathy.   Neurological:      Mental Status: She is alert and oriented to person, place, and time.         Assessment/Plan     Problem List Items Addressed This Visit       Sinobronchitis - Primary    Augmentin BID #20  Steroid taper as directed  Take both with food  Report status on Friday  To consider adding an Albuterol inhaler.         Relevant Medications    amoxicillin-pot clavulanate (AUGMENTIN) 875-125 mg per tablet    predniSONE (DELTASONE) 10 mg tablet           CHOCO Laws  4/23/2025

## 2025-04-23 NOTE — ASSESSMENT & PLAN NOTE
Augmentin BID #20  Steroid taper as directed  Take both with food  Report status on Friday  To consider adding an Albuterol inhaler.

## 2025-05-19 ENCOUNTER — APPOINTMENT (OUTPATIENT)
Dept: URBAN - METROPOLITAN AREA CLINIC 203 | Age: 45
Setting detail: DERMATOLOGY
End: 2025-05-20

## 2025-05-19 DIAGNOSIS — Z80.8 FAMILY HISTORY OF MALIGNANT NEOPLASM OF OTHER ORGANS OR SYSTEMS: ICD-10-CM

## 2025-05-19 DIAGNOSIS — L81.4 OTHER MELANIN HYPERPIGMENTATION: ICD-10-CM

## 2025-05-19 DIAGNOSIS — D22 MELANOCYTIC NEVI: ICD-10-CM

## 2025-05-19 DIAGNOSIS — L57.8 OTHER SKIN CHANGES DUE TO CHRONIC EXPOSURE TO NONIONIZING RADIATION: ICD-10-CM

## 2025-05-19 DIAGNOSIS — Z71.89 OTHER SPECIFIED COUNSELING: ICD-10-CM

## 2025-05-19 PROBLEM — D22.5 MELANOCYTIC NEVI OF TRUNK: Status: ACTIVE | Noted: 2025-05-19

## 2025-05-19 PROCEDURE — 99213 OFFICE O/P EST LOW 20 MIN: CPT

## 2025-05-19 PROCEDURE — OTHER COUNSELING: OTHER

## 2025-05-19 PROCEDURE — OTHER SUNSCREEN RECOMMENDATIONS: OTHER

## 2025-05-19 ASSESSMENT — LOCATION DETAILED DESCRIPTION DERM
LOCATION DETAILED: LEFT MEDIAL BREAST 10-11:00 REGION
LOCATION DETAILED: MEDIAL FRONTAL SCALP
LOCATION DETAILED: SUPERIOR THORACIC SPINE
LOCATION DETAILED: LEFT MEDIAL BREAST 11-12:00 REGION
LOCATION DETAILED: PERIUMBILICAL SKIN
LOCATION DETAILED: MIDDLE STERNUM
LOCATION DETAILED: RIGHT MEDIAL UPPER BACK

## 2025-05-19 ASSESSMENT — LOCATION SIMPLE DESCRIPTION DERM
LOCATION SIMPLE: RIGHT UPPER BACK
LOCATION SIMPLE: CHEST
LOCATION SIMPLE: LEFT BREAST
LOCATION SIMPLE: FRONTAL SCALP
LOCATION SIMPLE: UPPER BACK
LOCATION SIMPLE: ABDOMEN

## 2025-05-19 ASSESSMENT — LOCATION ZONE DERM
LOCATION ZONE: TRUNK
LOCATION ZONE: SCALP

## 2025-07-03 DIAGNOSIS — I10 ESSENTIAL HYPERTENSION, BENIGN: ICD-10-CM

## 2025-07-03 DIAGNOSIS — F41.1 GENERALIZED ANXIETY DISORDER: ICD-10-CM

## 2025-07-03 RX ORDER — VALSARTAN 80 MG/1
80 TABLET ORAL DAILY
Qty: 90 TABLET | Refills: 1 | Status: SHIPPED | OUTPATIENT
Start: 2025-07-03 | End: 2025-12-30

## 2025-07-03 RX ORDER — SERTRALINE HYDROCHLORIDE 50 MG/1
TABLET, FILM COATED ORAL
Qty: 45 TABLET | Refills: 5 | Status: SHIPPED | OUTPATIENT
Start: 2025-07-03

## 2025-07-03 NOTE — TELEPHONE ENCOUNTER
"Medicine Refill Request    Last Office Visit: 4/23/2025   Last Consult Visit: Visit date not found  Last Telemedicine Visit: 7/24/2024 Beronica Alaniz CRNP    Next Appointment: 7/22/2025      Current Outpatient Medications:     predniSONE (DELTASONE) 10 mg tablet, 2 po BID x 3 then 1 po BID x 3 then 1 po QD x 3. Take with food., Disp: 21 tablet, Rfl: 0    sertraline (ZOLOFT) 50 mg tablet, take 1 and 1/2 tablets by mouth at bedtime, Disp: 45 tablet, Rfl: 5    valsartan (DIOVAN) 80 mg tablet, Take 1 tablet (80 mg total) by mouth daily., Disp: 90 tablet, Rfl: 1    BP Readings from Last 3 Encounters:   04/23/25 120/84   01/14/25 114/78   07/09/24 138/82       Recent Lab results:  Lab Results   Component Value Date    CHOL 257 (H) 08/10/2024   ,   Lab Results   Component Value Date    HDL 45 08/10/2024   ,   Lab Results   Component Value Date    LDLCALC 181 (H) 08/10/2024   ,   Lab Results   Component Value Date    TRIG 168 (H) 08/10/2024        Lab Results   Component Value Date    GLUCOSE 104 (H) 08/10/2024   , No results found for: \"HGBA1C\"      Lab Results   Component Value Date    CREATININE 1.04 (H) 08/10/2024       Lab Results   Component Value Date    TSH 1.520 04/10/2023         No results found for: \"HGBA1C\"  "

## 2025-07-22 ENCOUNTER — OFFICE VISIT (OUTPATIENT)
Dept: PRIMARY CARE | Facility: CLINIC | Age: 45
End: 2025-07-22
Payer: COMMERCIAL

## 2025-07-22 VITALS
HEIGHT: 62 IN | DIASTOLIC BLOOD PRESSURE: 82 MMHG | BODY MASS INDEX: 30.36 KG/M2 | HEART RATE: 78 BPM | OXYGEN SATURATION: 98 % | TEMPERATURE: 97.3 F | SYSTOLIC BLOOD PRESSURE: 130 MMHG | WEIGHT: 165 LBS

## 2025-07-22 DIAGNOSIS — E78.00 HYPERCHOLESTEROLEMIA: ICD-10-CM

## 2025-07-22 DIAGNOSIS — F41.1 GENERALIZED ANXIETY DISORDER: ICD-10-CM

## 2025-07-22 DIAGNOSIS — I10 ESSENTIAL HYPERTENSION, BENIGN: Primary | ICD-10-CM

## 2025-07-22 PROBLEM — J32.9 SINOBRONCHITIS: Status: RESOLVED | Noted: 2020-02-14 | Resolved: 2025-07-22

## 2025-07-22 PROBLEM — J40 SINOBRONCHITIS: Status: RESOLVED | Noted: 2020-02-14 | Resolved: 2025-07-22

## 2025-07-22 PROCEDURE — 3008F BODY MASS INDEX DOCD: CPT | Performed by: NURSE PRACTITIONER

## 2025-07-22 PROCEDURE — 3079F DIAST BP 80-89 MM HG: CPT | Performed by: NURSE PRACTITIONER

## 2025-07-22 PROCEDURE — 3075F SYST BP GE 130 - 139MM HG: CPT | Performed by: NURSE PRACTITIONER

## 2025-07-22 PROCEDURE — 99214 OFFICE O/P EST MOD 30 MIN: CPT | Performed by: NURSE PRACTITIONER

## 2025-07-22 RX ORDER — SERTRALINE HYDROCHLORIDE 50 MG/1
TABLET, FILM COATED ORAL
Qty: 135 TABLET | Refills: 1 | Status: SHIPPED | OUTPATIENT
Start: 2025-07-22

## 2025-07-22 RX ORDER — VALSARTAN 80 MG/1
80 TABLET ORAL DAILY
Qty: 90 TABLET | Refills: 1 | Status: SHIPPED | OUTPATIENT
Start: 2025-07-22 | End: 2026-01-18

## 2025-07-22 ASSESSMENT — ENCOUNTER SYMPTOMS
PANIC: 0
SHORTNESS OF BREATH: 0
MYALGIAS: 0
DEPRESSED MOOD: 0
HYPERTENSION: 1
PALPITATIONS: 0
NERVOUS/ANXIOUS: 1
INSOMNIA: 0

## 2025-07-22 ASSESSMENT — PAIN SCALES - GENERAL: PAINLEVEL_OUTOF10: 0-NO PAIN

## 2025-07-22 ASSESSMENT — PATIENT HEALTH QUESTIONNAIRE - PHQ9: SUM OF ALL RESPONSES TO PHQ9 QUESTIONS 1 & 2: 0

## 2025-07-22 NOTE — PROGRESS NOTES
[facility] at [address]  [place] [address]  [phone]  Fax [phone]      Patient ID: Ro Beck                              : 1980    Visit Date: 2025    Chief Complaint: Follow-up         Patient ID: oR Beck is a 44 y.o. female.    Patient Active Problem List   Diagnosis    Essential hypertension, benign    Generalized anxiety disorder    Routine physical examination    Hypercholesterolemia         Current Outpatient Medications:     sertraline (ZOLOFT) 50 mg tablet, take 1 and 1/2 tablets by mouth at bedtime, Disp: 135 tablet, Rfl: 1    valsartan (DIOVAN) 80 mg tablet, Take 1 tablet (80 mg total) by mouth daily., Disp: 90 tablet, Rfl: 1    No Known Allergies    Social History     Tobacco Use    Smoking status: Never    Smokeless tobacco: Never       Health Maintenance   Topic Date Due    Cervical Cancer Screening  2024    Breast Cancer Screening  10/02/2024    COVID-19 Vaccine ( season) 2026 (Originally 12/10/2024)    Influenza Vaccine (1) 2025    Depression Screening  2026    DTaP, Tdap, and Td Vaccines (4 - Td or Tdap) 2032    Zoster Vaccine (1 of 2) 2032    RSV Vaccine (1 - 1-dose 75+ series) 2057    Meningococcal ACWY  Aged Out    RSV <20 months  Aged Out    HIB Vaccines  Aged Out    IPV Vaccines  Aged Out    Meningococcal B  Aged Out    Pneumococcal  Aged Out    Hepatitis B Vaccines  Discontinued    HPV Vaccines  Discontinued    HIV Screening  Discontinued    Hepatitis C Screening  Discontinued       HPI  Routine med check    Hypertension  This is a chronic problem. The current episode started more than 1 year ago. The problem is unchanged. The problem is controlled. Associated symptoms include anxiety. Pertinent negatives include no chest pain, palpitations, peripheral edema or shortness of breath. There are no associated agents to hypertension.  "Past treatments include angiotensin blockers. The current treatment provides significant improvement. There are no compliance problems.  There is no history of chronic renal disease.   Hyperlipidemia  This is a chronic problem. The current episode started more than 1 year ago. The problem is uncontrolled. Recent lipid tests were reviewed and are high. Exacerbating diseases include obesity. She has no history of chronic renal disease, diabetes, hypothyroidism, liver disease or nephrotic syndrome. There are no known factors aggravating her hyperlipidemia. Pertinent negatives include no chest pain, myalgias or shortness of breath. Current antihyperlipidemic treatment includes diet change and exercise. There are no compliance problems.    Anxiety  Presents for follow-up visit. Symptoms include excessive worry and nervous/anxious behavior. Patient reports no chest pain, depressed mood, insomnia, palpitations, panic, shortness of breath or suicidal ideas. Symptoms occur occasionally. The severity of symptoms is moderate.     Compliance with medications is % (on Sertraline 75mg). Treatment side effects: none.       The following have been reviewed and updated as appropriate in this visit:   Allergies  Meds  Problems         Review of System  Review of Systems   Respiratory:  Negative for shortness of breath.    Cardiovascular:  Negative for chest pain and palpitations.   Musculoskeletal:  Negative for myalgias.   Psychiatric/Behavioral:  Negative for suicidal ideas. The patient is nervous/anxious. The patient does not have insomnia.        Objective     Vitals  Vitals:    07/22/25 1259   BP: 130/82   BP Location: Left upper arm   Patient Position: Sitting   Pulse: 78   Temp: 36.3 °C (97.3 °F)   TempSrc: Temporal   SpO2: 98%   Weight: 74.8 kg (165 lb)   Height: 1.575 m (5' 2.01\")     Body mass index is 30.17 kg/m².      Physical Exam  Vitals reviewed.   Constitutional:       General: She is not in acute distress.   "   Appearance: Normal appearance. She is not ill-appearing, toxic-appearing or diaphoretic.   Cardiovascular:      Rate and Rhythm: Normal rate and regular rhythm.      Heart sounds: No murmur heard.     No friction rub. No gallop.   Pulmonary:      Effort: Pulmonary effort is normal.      Breath sounds: Normal breath sounds. No wheezing, rhonchi or rales.   Musculoskeletal:      Right lower leg: No edema.      Left lower leg: No edema.   Neurological:      Mental Status: She is alert and oriented to person, place, and time.   Psychiatric:         Mood and Affect: Mood and affect normal.         Speech: Speech normal.         Behavior: Behavior normal.         Thought Content: Thought content does not include suicidal ideation. Thought content does not include suicidal plan.         Assessment/Plan     Problem List Items Addressed This Visit       Essential hypertension, benign - Primary    BP stable  Labs ordered  Continue med  Follow up 6 mo         Relevant Medications    valsartan (DIOVAN) 80 mg tablet    Other Relevant Orders    CBC and Differential    Comprehensive metabolic panel    Urinalysis with Reflex Culture (ED and Outpatient only)    Generalized anxiety disorder    Stable on Sertraline  Continue med  Follow up 6 mo         Relevant Medications    sertraline (ZOLOFT) 50 mg tablet    Hypercholesterolemia    Labs ordered         Relevant Orders    Lipid panel           CHOCO Laws  7/22/2025